# Patient Record
(demographics unavailable — no encounter records)

---

## 2017-01-03 NOTE — EMERGENCY ROOM REPORT
History of Present Illness


General


Chief Complaint:  Female Urogenital Problems


Source:  Patient





Present Illness


HPI


Patient presents with a rash in the vaginal area.  Been going on for a week.  

She thinks is due to allergy to condoms.  She is sexually active.  No fever or 

chills but no nausea no vomiting.  Discharge.  No urinary frequency.  Burns 

with urination.


Allergies:  


Coded Allergies:  


     PENICILLINS (Verified  Allergy, Severe, Anaphylaxis, 1/3/17)


     PINEAPPLE (Verified  Allergy, Severe, 1/3/17)


     SULFAMETHOXAZOLE (Verified  Allergy, Severe, Anaphylaxis, 1/3/17)


     TRIMETHOPRIM (Verified  Allergy, Severe, Anaphylaxis, 1/3/17)


     LATEX, NATURAL RUBBER (Verified  Allergy, Unknown, 1/3/17)





Patient History


Past Medical History:  see triage record, old chart reviewed, DM


Past Surgical History:  none


Pertinent Family History:  none


Social History:  Denies: smoking


Last Menstrual Period:  August 2016


Pregnant Now:  No


Immunizations:  other


Reviewed Nursing Documentation:  PMH: Agreed, PSxH: Agreed





Nursing Documentation-PMH


Past Medical History:  No History, Except For


Hx Hypertension:  Yes


Hx Asthma:  Yes


Hx Diabetes:  Yes - borderline DM2





Review of Systems


Eye:  Denies: blurred vision, eye pain


ENT:  Denies: ear pain, nose congestion, throat swelling


Respiratory:  Denies: cough, shortness of breath


Cardiovascular:  Denies: chest pain, palpitations


Gastrointestinal:  Denies: abdominal pain, diarrhea, nausea, vomiting


Musculoskeletal:  Denies: back pain, joint pain


Skin:  Denies: rash


Neurological:  Denies: headache, numbness


Endocrine:  Denies: increased thirst, increased urine


Hematologic/Lymphatic:  Denies: easy bruising


All Other Systems:  negative except mentioned in HPI





Physical Exam





Vital Signs








  Date Time  Temp Pulse Resp B/P Pulse Ox O2 Delivery O2 Flow Rate FiO2


 


1/3/17 01:36 98.6 99 16 122/85 97 Room Air  





vitals normal.


Sp02 EP Interpretation:  reviewed, normal


General Appearance:  well appearing, no apparent distress, alert


Head:  normocephalic, atraumatic


Eyes:  bilateral eye EOMI, bilateral eye PERRL


ENT:  hearing grossly normal, normal pharynx


Neck:  full range of motion, supple, no meningismus


Respiratory:  chest non-tender, lungs clear, normal breath sounds


Cardiovascular #1:  regular rate, rhythm, no murmur


Gastrointestinal:  normal bowel sounds, non tender, no mass, no organomegaly, 

no bruit, non-distended


Genitourinary:  other - vaginal area is irritated and has macerated skin. 

Diffuse erythema.


Musculoskeletal:  back normal, gait/station normal, normal range of motion


Neurologic:  alert, oriented x3


Psychiatric:  mood/affect normal


Skin:  warm/dry





Medical Decision Making


Diagnostic Impression:  


 Primary Impression:  


 UTI (lower urinary tract infection)


 Additional Impressions:  


 Vaginitis


 Qualified Codes:  N76.0 - Acute vaginitis


 Hyperglycemia due to type 2 diabetes mellitus


 Qualified Codes:  E11.65 - Type 2 diabetes mellitus with hyperglycemia


 Morbid obesity with BMI of 40.0-44.9, adult


ER Course


She presents with a vaginitis most likely secondary to her diabetes.  She says 

in her tract infection.  Will treat both.  No evidence of ectopic.  We'll 

discharge home.





Last Vital Signs








  Date Time  Temp Pulse Resp B/P Pulse Ox O2 Delivery O2 Flow Rate FiO2


 


1/3/17 01:40 98.6  16 125/86 100 Room Air  


 


1/3/17 01:36  99      








Status:  improved


Disposition:  HOME, SELF-CARE


Condition:  Stable


Scripts


Nitrofurantoin Monohyd/M-Cryst (Nitrofurantoin Mono-Mcr 100 mg) 100 Mg Capsule


100 MG ORAL Q12H, #14 CAP


   Prov: TREMAINE HERRING M.D.         1/3/17 


Nystatin/Triamcin (NYSTATIN-TRIAMCINOLONE CREAM) 15 Gm Cream..g.


15 GM TP BID, #60 GM


   Prov: TREMAINE HERRING M.D.         1/3/17


Patient Instructions:  Vaginal Yeast Infection, Adult





Additional Instructions:  


Followup with your DrColeen in 7 days.  Return if symptom worsen.











TREMAINE HERRING M.D. Joel 3, 2017 02:57

## 2017-02-02 NOTE — EMERGENCY ROOM REPORT
History of Present Illness


General


Chief Complaint:  Female Urogenital Problems


Source:  Patient





Present Illness


HPI


31-year-old female presents to emergency Department complaining of itchy/

burning vaginal irritation x4 days.  She states she has a history of recurring 

rashes in the vaginal area.  Patient reports swelling to the external right 

labia the rash started in progress over to the left.  Patient reports pain 10 

on a 10 in nature described as burning and sensitive to light touch.  She 

denies swelling in the lymph nodes.  Patient reports being sexually active, 

does not take birth control, and has irregular periods.  Her last period was in 

September.  She denies nausea, vomiting, fevers, chills.  Patient reports mild 

burning with urination. Denies CP, Palpitations, LOC, AMS, dizziness, Changes 

in Vision, Sensation, paresthesias, or a sudden severe headache.


Allergies:  


Coded Allergies:  


     PENICILLINS (Verified  Allergy, Severe, Anaphylaxis, 1/3/17)


     PINEAPPLE (Verified  Allergy, Severe, 1/3/17)


     SULFAMETHOXAZOLE (Verified  Allergy, Severe, Anaphylaxis, 1/3/17)


     TRIMETHOPRIM (Verified  Allergy, Severe, Anaphylaxis, 1/3/17)


     LATEX, NATURAL RUBBER (Verified  Allergy, Unknown, 1/3/17)





Patient History


Past Medical History:  see triage record


Past Surgical History:  none


Pertinent Family History:  none


Last Menstrual Period:  09/2015


Immunizations:  UTD


Reviewed Nursing Documentation:  PMH: Agreed, PSxH: Agreed





Nursing Documentation-PMH


Past Medical History:  No History, Except For


Hx Hypertension:  Yes


Hx Asthma:  Yes


Hx Diabetes:  Yes - Borderline





Review of Systems


All Other Systems:  negative except mentioned in HPI





Physical Exam





Vital Signs








  Date Time  Temp Pulse Resp B/P Pulse Ox O2 Delivery O2 Flow Rate FiO2


 


2/2/17 12:54 98.8 96 16 128/87 96 Room Air  








Sp02 EP Interpretation:  reviewed, normal


General Appearance:  no apparent distress, alert, GCS 15, non-toxic


Head:  normocephalic, atraumatic


Eyes:  bilateral eye PERRL, bilateral eye normal inspection


ENT:  hearing grossly normal, normal pharynx, no angioedema, normal voice


Neck:  full range of motion, supple/symm/no masses


Respiratory:  chest non-tender, lungs clear, normal breath sounds, speaking 

full sentences


Cardiovascular #1:  regular rate, rhythm


Gastrointestinal:  normal bowel sounds, non tender, soft, no mass, no guarding


Rectal:  deferred


Genitourinary:  normal inspection, no CVA tenderness, other - swelling, erythema

, and increased sensitivity with rash of the external labia. no vessicles, no 

crusting, no d/c noted macerated appearance.


Musculoskeletal:  back normal, gait/station normal


Neurologic:  alert, oriented x3, responsive, motor strength/tone normal, 

sensory intact, speech normal


Psychiatric:  judgement/insight normal, memory normal, mood/affect normal, no 

suicidal/homicidal ideation


Skin:  normal color, warm/dry, well hydrated, rash - swelling, erythema, and 

increased sensitivity with rash of the external labia. no vessicles, no crusting

, no d/c noted macerated appearance. , other


Lymphatic:  no adenopathy





Medical Decision Making


PA Attestation


Dr. Prieto is my supervising Physician whom patient management has been 

discussed with.


Diagnostic Impression:  


 Primary Impression:  


 Vaginitis


 Qualified Codes:  N76.0 - Acute vaginitis


 Additional Impressions:  


 Rash and other nonspecific skin eruption


 UTI (lower urinary tract infection)


ER Course


31-year-old female presents to emergency Department complaining of itchy/

burning vaginal irritation x4 days. 


Ddx considered but are not limited to UTi , Pyelo, STI, Stone, Cystitis, 

vaginal laceration, vaginitis, abscess, cellulitis, LGV


Vital signs: are WNL, pt. is afebrile 


H& PE are most consistent with: with Vaginitis, will also check for UTI due to 

pt. having dysuria.


ORDERS:


- UA labs are attached : WBC's, Leukocyte esterases and few bacteria  

consistent with UTI. 





ED INTERVENTIONS: None required at this time. 


DISCHARGE: At this time pt. is stable for d/c to home. Will provide printed 

patient care instructions, and any necessary prescriptions. Care plan and 

follow up instructions have been discussed with the patient prior to discharge.





Labs








Test


  2/2/17


13:20


 


Urine Color Pale yellow 


 


Urine Appearance


  Slightly


cloudy


 


Urine pH 7 (4.5-8.0) 


 


Urine Specific Gravity


  1.005


(1.005-1.035)


 


Urine Protein


  Negative


(NEGATIVE)


 


Urine Glucose (UA) 4+ (NEGATIVE) 


 


Urine Ketones 2+ (NEGATIVE) 


 


Urine Occult Blood 3+ (NEGATIVE) 


 


Urine Nitrite


  Negative


(NEGATIVE)


 


Urine Bilirubin


  Negative


(NEGATIVE)


 


Urine Urobilinogen


  Normal MG/DL


(0.0-1.0)


 


Urine Leukocyte Esterase 3+ (NEGATIVE) 


 


Urine RBC


  2-4 /HPF (0 -


2)


 


Urine WBC


  5-10 /HPF (0 -


2)


 


Urine Squamous Epithelial


Cells Few /LPF


(NONE/OCC)


 


Urine Bacteria


  Few /HPF


(NONE)


 


Urine HCG, Qualitative Negative 











Last Vital Signs








  Date Time  Temp Pulse Resp B/P Pulse Ox O2 Delivery O2 Flow Rate FiO2


 


2/2/17 13:06 98.8 81 16 128/87 96 Room Air  








Disposition:  HOME, SELF-CARE


Condition:  Stable


Scripts


Nystatin* (NYSTATIN*) 15 Gm Cream..g.


1 APPLIC TOPIC THREE TIMES A DAY, #15 GM


   Prov: Evette Lee         2/2/17 


Fluconazole (FLUCONAZOLE) 100 Mg Tablet


100 MG ORAL DAILY for 3 Days, #3 TAB 0 Refills


   Prov: Evette Lee         2/2/17 


Nitrofurantoin Monohyd/M-Cryst* (MACROBID 100 MG*) 100 Mg Capsule


100 MG ORAL EVERY 12 HOURS for 5 Days, #10 CAP


   Prov: Evette Lee         2/2/17


Referrals:  


NOT CHOSEN [facility]/MD,REFERRING (PCP)


Patient Instructions:  Vaginitis, Urinary Tract Infection





Additional Instructions:  


Take medications as directed. 


Follow up with PCP in 3-5 days 


Return sooner to ED if new symptoms occur, or current symptoms become worse.











Evette Lee Feb 2, 2017 14:30

## 2017-03-06 NOTE — DIAGNOSTIC IMAGING REPORT
Indication: PAIN



Technique: 3 views right foot



Comparison: none



Findings: There is metatarsus adductus and mild hallux valgus. Otherwise normal 

bony

alignment. No acute fractures. No dislocations. The joint spaces are preserved.



Impression: Negative

## 2017-03-07 NOTE — EMERGENCY ROOM REPORT
History of Present Illness


General


Chief Complaint:  Lower Extremity Injury





Present Illness


HPI


The pt is a 32 yo F presenting for R toe pain which began one week prior after 

dropping an iPad onto it from 3 feet up. Pt states pain is a 7/10 dull ache and 

does not radiate. The pt denies prior injury to this area. Pt denies numbness/

tingling. Pain worse with touch. Pt denies any other symptoms. 


 (JUANA ROBLES P.A.)


Allergies:  


Coded Allergies:  


     PENICILLINS (Verified  Allergy, Severe, Anaphylaxis, 1/3/17)


     PINEAPPLE (Verified  Allergy, Severe, 1/3/17)


     SULFAMETHOXAZOLE (Verified  Allergy, Severe, Anaphylaxis, 1/3/17)


     TRIMETHOPRIM (Verified  Allergy, Severe, Anaphylaxis, 1/3/17)


     LATEX, NATURAL RUBBER (Verified  Allergy, Unknown, 1/3/17)





Patient History


Past Medical History:  see triage record


Pertinent Family History:  none


Reviewed Nursing Documentation:  PMH: Agreed, PSxH: Agreed (JUANA ROBLES P.A.)





Nursing Documentation-PMH


Hx Hypertension:  Yes


Hx Asthma:  Yes


Hx Diabetes:  Yes - Borderline


 (JUANA ROBLES P.A.)





Review of Systems


All Other Systems:  negative except mentioned in HPI


 (JUANA ROBLES P.A.)





Physical Exam





Vital Signs








  Date Time  Temp Pulse Resp B/P Pulse Ox O2 Delivery O2 Flow Rate FiO2


 


3/6/17 13:46 98.6 96 20 110/78 97 Room Air  








Sp02 EP Interpretation:  reviewed, normal


General Appearance:  no apparent distress, alert, GCS 15, non-toxic


Head:  normocephalic, atraumatic


Eyes:  bilateral eye PERRL, bilateral eye normal inspection


ENT:  hearing grossly normal, normal pharynx, no angioedema, normal voice


Musculoskeletal:  gait/station normal, normal range of motion, tender - TTP 

over distal R 1st toe


Neurologic:  alert, oriented x3, responsive, motor strength/tone normal, 

sensory intact, speech normal


Psychiatric:  judgement/insight normal, memory normal, mood/affect normal, no 

suicidal/homicidal ideation


Reflexes:  3+ bicep (R), 3+ bicep (L), 3+ tricep (R), 3+ tricep (L), 3+ knee (R)

, 3+ knee (L)


Skin:  normal color, no rash, warm/dry, well hydrated


Lymphatic:  no adenopathy


 (JUANA ROBLES)





Medical Decision Making


PA Attestation


Dr. Bowie is my supervising physician. Patient management was discussed 

with my supervising physician


 (JUANA ROBLES)


Diagnostic Impression:  


 Primary Impression:  


 Contusion, toe


ER Course


The pt is a 32 yo F presenting for R toe pain which began one week prior





Ddx considered include but not limited to sprain/strain, fracture, contusion





PE:


Vitals WNL. 


NAD.


R 1st toe: Skin intact. No erythema or edema. Sensation intact to light touch. 

Full AROM.  No joint laxity. No obvious deformity. + TTP over distal 1st toe. 

No subungual hematoma. 





Pt is given motrin for pain





Xray is unremarkable. 





Pt will be MO'ed home and will FU with PMD. ER precautions given 


 (JUANA ROBLES)


ER Course


Scribe documentation reviewed by me and is accurate.


 (Stefan Prieto M.D.)





Other X-Ray Diagnostic Results


Other X-Ray Diagnostic Results :  


   X-Ray Ordered:  R foot xray


   Date:  Mar 7, 2017


   EP Interpretation:  Yes


   Findings:  no fractures, no dislocation, no soft tissue swelling


   Number of Views:  3


   PA Scribe Text


I am acting as scribe for my supervising physician. My supervising physician's 

interpretation of the R foot xrays are there are no fractures, dislocations or 

soft tissue swelling.


 (JUANA ROBLES)





Last Vital Signs








  Date Time  Temp Pulse Resp B/P Pulse Ox O2 Delivery O2 Flow Rate FiO2


 


3/6/17 14:46 98.6 98 17 141/91 98 Room Air  








Status:  improved


 (JUANA ROBLES P.A.)


Disposition:  HOME, SELF-CARE


Condition:  Improved


Scripts


Ibuprofen* (MOTRIN*) 600 Mg Tablet


600 MG ORAL Q8H Y for For Pain, #30 TAB 0 Refills


   Prov: JUANA ROBLES         3/6/17


Referrals:  


NON PHYSICIAN (PCP)


Patient Instructions:  Foot Contusion





Additional Instructions:  


I discussed my findings with the patient. All questions and concerns have been 

answered. Treatment and medication compliance have been addressed. I advised 

the patient that they need to follow up with PMD in 3-5 days. Return to ED if 

pain remains or worsens, numbness or tingling occurs, new rash is noticed, 

fever is noticed, or if needed for any reason. Patient verbalized understanding 

of discharge instructions.











JUANA ROBLES Mar 7, 2017 12:35


Stefan Prieto M.D. Mar 8, 2017 02:24

## 2017-06-19 NOTE — EMERGENCY ROOM REPORT
History of Present Illness


General


Chief Complaint:  Headache


Source:  Patient





Present Illness


[facility]


The patient is a 32-year-old female presenting for headache and vaginal pain.  

The patient states that she noticed dysuria one week prior as well as a vaginal 

rash.  Rash is both described as itchy and painful.  Pain described as a 5/10 

burning sensation and does not radiate.  Worse with touch.  She denies any 

vaginal discharge, hematuria, flank pain, fever, chills.


She also admits to headache which began last night and is described as an 8/10 

dull ache to the left side of the head.  Pain radiates to the left thigh.  She 

admits to photophobia.  She states that she has had migraine headaches in the 

past and this feels similar.  She has not tried any medications yet.


She denies other symptoms including nausea, vomiting


Allergies:  


Coded Allergies:  


     PENICILLINS (Verified  Allergy, Severe, Anaphylaxis, 1/3/17)


     PINEAPPLE (Verified  Allergy, Severe, 1/3/17)


     SULFAMETHOXAZOLE (Verified  Allergy, Severe, Anaphylaxis, 1/3/17)


     TRIMETHOPRIM (Verified  Allergy, Severe, Anaphylaxis, 1/3/17)


     LATEX, NATURAL RUBBER (Verified  Allergy, Unknown, 1/3/17)





Patient History


Past Medical History:  see triage record


Pertinent Family History:  none


Last Menstrual Period:  last year


Pregnant Now:  No


Reviewed Nursing Documentation:  PMH: Agreed, PSxH: Agreed





Nursing Documentation-PMH


Past Medical History:  No History, Except For


Hx Hypertension:  Yes


Hx Asthma:  Yes


Hx Diabetes:  Yes - Borderline





Review of Systems


All Other Systems:  negative except mentioned in HPI





Physical Exam





Vital Signs








  Date Time  Temp Pulse Resp B/P Pulse Ox O2 Delivery O2 Flow Rate FiO2


 


6/19/17 14:22 98.4 97 18 113/83 98 Room Air  








Sp02 EP Interpretation:  reviewed, normal


General Appearance:  no apparent distress, alert, GCS 15, non-toxic


Head:  normocephalic, atraumatic


Eyes:  bilateral eye PERRL, bilateral eye normal inspection


ENT:  hearing grossly normal, normal pharynx, no angioedema, normal voice


Neck:  full range of motion, supple/symm/no masses


Respiratory:  chest non-tender, lungs clear, normal breath sounds, speaking 

full sentences


Gastrointestinal:  normal bowel sounds, non tender, soft, non-distended, no 

guarding, no rebound


Genitourinary:  no CVA tenderness, other - external vaginal erythema with 

Satellite lesions. Exam done with Jordyn in room


Musculoskeletal:  back normal, gait/station normal, normal range of motion, non-

tender


Neurologic:  alert, oriented x3, responsive, motor strength/tone normal, 

sensory intact, speech normal


Psychiatric:  judgement/insight normal, memory normal, mood/affect normal, no 

suicidal/homicidal ideation


Skin:  rash - large erythema of external vagina with satelite lesions


Lymphatic:  no adenopathy





Medical Decision Making


PA Attestation


Dr. Shaffer is my supervising physician. Patient management was discussed with 

my supervising physician


Diagnostic Impression:  


 Primary Impression:  


 Candidiasis of female genitalia


 Additional Impressions:  


 Urinary tract infection


 Qualified Codes:  N39.0 - Urinary tract infection, site not specified


 Migraine


 Qualified Codes:  G43.909 - Migraine, unspecified, not intractable, without 

status migrainosus


ER Course


The patient is a 32-year-old female presenting for headache and vaginal pain





Differential diagnoses include but not limited to Migraine, tension headache, 

meningitis, UTI, BV, candidiasis, among others





PE: afebrile. NAD


HEENT unremarkable. PERRL. EOMI. 


Neck is soft and supple. 


Abd is soft. Non tender. No CVA tenderness





There is external vaginal diffuse erythema with satellite lesions.  No edema.  

No discharge





Urinalysis shows leukocyte esterase and white blood cells.  Negative nitrites





The patient is treated for headache with IM Toradol, Bentyl, and regular.  She 

feels better.





The patient will be treated for UTI as well as external vaginal candidiasis.





She will follow up with primary doctor.  ER precautions given





Laboratory Tests








Test


  6/19/17


15:00


 


Urine Color Pale yellow  


 


Urine Appearance


  Slightly


cloudy


 


Urine pH 6.5 (4.5-8.0)  


 


Urine Specific Gravity


  1.010


(1.005-1.035)


 


Urine Protein


  Negative


(NEGATIVE)


 


Urine Glucose (UA)


  4+ (NEGATIVE)


H


 


Urine Ketones


  3+ (NEGATIVE)


H


 


Urine Occult Blood


  Negative


(NEGATIVE)


 


Urine Nitrite


  Negative


(NEGATIVE)


 


Urine Bilirubin


  Negative


(NEGATIVE)


 


Urine Urobilinogen


  Normal MG/DL


(0.0-1.0)


 


Urine Leukocyte Esterase


  3+ (NEGATIVE)


H


 


Urine RBC


  2-4 /HPF (0 -


2)  H


 


Urine WBC


  5-10 /HPF (0 -


2)  H


 


Urine Squamous Epithelial


Cells Moderate /LPF


(NONE/OCC)  H


 


Urine Bacteria


  Few /HPF


(NONE)


 


Urine HCG, Qualitative Negative  








Lab Results Impression


Neg nitrites. There are elevated WBCs with LE and urine bacteria.





Last Vital Signs








  Date Time  Temp Pulse Resp B/P Pulse Ox O2 Delivery O2 Flow Rate FiO2


 


6/19/17 16:06 98.3 92 16 122/75 97 Room Air  








Status:  improved


Disposition:  HOME, SELF-CARE


Condition:  Improved


Scripts


Clotrimazole (Clotrimazole) 30 Gm Cream..g.


1 APPLIC TP Q12HR, #30 GM


   Prov: JUANA ROBLES.A.         6/19/17 


Cephalexin* (KEFLEX*) 500 Mg Capsule


500 MG ORAL EVERY 6 HOURS, #28 CAP


   Prov: UJANA ROBLES P.A.         6/19/17 


Ibuprofen* (MOTRIN*) 600 Mg Tablet


600 MG ORAL Q8H Y for For Pain, #30 TAB 0 Refills


   Prov: JUANA ROBLES P.A.         6/19/17


Patient Instructions:  Urinary Tract Infection, Skin Yeast Infection, Migraine 

Headache





Additional Instructions:  


I discussed my findings with the patient. All questions and concerns have been 

answered. Treatment and medication compliance have been addressed. I advised 

the patient that they need to follow up with PMD in 3-5 days. Return to ED if 

symptoms worsen, new symptoms arise, or if needed for any reason. Patient 

verbalized understanding of discharge instructions.











JUANA ROBLES Jun 19, 2017 17:04

## 2017-08-24 NOTE — EMERGENCY ROOM REPORT
History of Present Illness


General


Chief Complaint:  Skin Rash/Abscess


Source:  Patient





Present Illness


HPI


32-year-old female presents to the emergency department complaining of pain, 

swelling and tenderness that she rates as 8/10 in severity in the right axilla 

x1.5 weeks.  Patient has a history of previous hidradenitis.  Patient denies 

fevers, chills, nausea, vomiting.  Patient reports intermittent discharge from 

the axilla.  Patient states she has not, normal PCP and has not followed up for 

her repeated hidradenitis. denies pregnancy.  Denies CP, Palpitations, LOC, AMS

, dizziness, Changes in Vision, Sensation, paresthesias, or a sudden severe 

headache.


Allergies:  


Coded Allergies:  


     PENICILLINS (Verified  Allergy, Severe, Anaphylaxis, 1/3/17)


     PINEAPPLE (Verified  Allergy, Severe, 1/3/17)


     SULFAMETHOXAZOLE (Verified  Allergy, Severe, Anaphylaxis, 1/3/17)


     TRIMETHOPRIM (Verified  Allergy, Severe, Anaphylaxis, 1/3/17)


     LATEX, NATURAL RUBBER (Verified  Allergy, Unknown, 1/3/17)





Patient History


Past Medical History:  see triage record


Past Surgical History:  none


Pertinent Family History:  none


Last Menstrual Period:  2016


Pregnant Now:  No - cycles are not regulated


:  0


Para:  0


Reviewed Nursing Documentation:  PMH: Agreed, PSxH: Agreed





Nursing Documentation-PMH


Hx Hypertension:  Yes


Hx Asthma:  Yes


Hx Diabetes:  Yes - Borderline





Review of Systems


All Other Systems:  negative except mentioned in HPI





Physical Exam





Vital Signs








  Date Time  Temp Pulse Resp B/P (MAP) Pulse Ox O2 Delivery O2 Flow Rate FiO2


 


17 13:17 98.2 107 24 109/63 97 Room Air  








Sp02 EP Interpretation:  reviewed, normal


General Appearance:  no apparent distress, alert, GCS 15, non-toxic


Head:  normocephalic, atraumatic


Eyes:  bilateral eye normal inspection, bilateral eye PERRL


ENT:  hearing grossly normal, normal pharynx, no angioedema, normal voice


Neck:  full range of motion, supple/symm/no masses


Respiratory:  lungs clear, normal breath sounds, speaking full sentences


Cardiovascular #1:  regular rate, rhythm, tachycardia


Musculoskeletal:  back normal, gait/station normal, normal range of motion, non-

tender


Neurologic:  alert, oriented x3, responsive, motor strength/tone normal, 

sensory intact, speech normal


Psychiatric:  judgement/insight normal, memory normal, mood/affect normal


Skin:  normal color, no rash, warm/dry, well hydrated, other - multiple areas 

of swelling/induration that is confluent with obvious tracking in the right 

axilla, skin has macerated appearance, is tender, and scant d/c noted.





Medical Decision Making


PA Attestation


Dr. Bowie  is my supervising Physician whom patient management has been 

discussed with.


Diagnostic Impression:  


 Primary Impression:  


 Hidradenitis suppurativa of right axilla


ER Course


32-year-old female presents to the emergency department complaining of pain, 

swelling and tenderness that she rates as 8/10 in severity in the right axilla 

x1.5 weeks.  Patient has a history of previous hidradenitis.  Patient denies 

fevers, chills, nausea, vomiting.  Patient reports intermittent discharge from 

the axilla.  Patient states she has not, normal PCP and has not followed up for 

her repeated hidradenitis. Denies CP, Palpitations, LOC, AMS, dizziness, 

Changes in Vision, Sensation, paresthesias, or a sudden severe headache.





Ddx considered but are not limited to cellulitis, abscess, hidradenitis, sepsis 

just to name a few





Vital signs: are WNL, pt. is afebrile


H&PE are most consistent with exacerbation of hidradenitis suppurativa of the 

right axilla.  left axilla is not infected at this time. no evidence to suggest 

spread of the infection. 


ORDERS: none required at this time, the diagnosis is clinical





ED INTERVENTIONS: 


-Wound dressing change by RN


- Bacitracin is applied. 





-d/w pt. that it is imperative to finish all abx that she will be prescribed, d/

w pt. that follow up is very important as if she continues to have frequent 

exacerbations that she may require surgical intervention. d/w pt. to return to 

the ED with worsening or new symptoms, such as fevers.  





DISCHARGE: At this time pt. is stable for d/c to home. Will provide printed 

patient care instructions, and any necessary prescriptions. Care plan and 

follow up instructions have been discussed with the patient prior to discharge.





Last Vital Signs








  Date Time  Temp Pulse Resp B/P (MAP) Pulse Ox O2 Delivery O2 Flow Rate FiO2


 


17 13:17 98.2 107 24 109/63 97 Room Air  








Disposition:  HOME, SELF-CARE


Condition:  Stable


Scripts


Lidocaine HCL 2% Jelly* (Lidocaine Jelly 2%*) 5 Ml Jel.pf.redd


1 ML TOPIC DAILY, #20 ML


   Prov: Evette Lee         17 


Acetaminophen* (TYLENOL EXTRA STRENGTH*) 500 Mg Tablet


500 MG ORAL Q6H, #20 TAB 0 Refills


   Prov: Evette Lee         17 


Doxycycline Hyclate* (VIBRAMYCIN*) 100 Mg Capsule


100 MG ORAL EVERY 12 HOURS for 7 Days, #14 CAP 0 Refills


   Prov: Evette Lee         17


Patient Instructions:  Hidradenitis Suppurativa





Additional Instructions:  


Take medications as directed. 


 ** Follow up with a Primary Care Provider in 3-5 days, even if your symptoms 

have resolved. ** 


--Please review list of primary care clinics, if you do not already have a 

primary care provider





Return sooner to ED if new symptoms occur, or current symptoms become worse. 


Do not drink alcohol, drive, or operate heavy machinery while taking Norco as 

this may cause drowsiness. 











- Please note that this Emergency Department Report was dictated using [employer] technology software, occasionally this can lead to 

erroneous entry secondary to interpretation by the dictation equipment.











Evette Lee Aug 24, 2017 14:07

## 2017-11-08 NOTE — EMERGENCY ROOM REPORT
History of Present Illness


General


Chief Complaint:  Skin Rash/Abscess


Source:  Patient, Medical Record





Present Illness


HPI


32-year-old female presents to the emergency department complaining of itchy 

rash in the vaginal area x2 days denies recent antibiotic use, lesions, 

bleeding or swollen tender lymph nodes.  Patient denies recent unprotected 

intercourse.  Patient does report scant thickened white vaginal DC.  She denies 

abdominal pain/tenderness.  Denies dysuria, hematuria she reports frequency.  

Patient also states that she is diabetic.  Patient also reports pain in the 

right toe that she rates as 7/10 in severity times several months.  Patient 

states that she fractured her toe several months ago and she continues to have 

dull pain.  Patient states she never followed up with orthopedic specialist.  

Patient denies new trauma or fall.  Denies erythema, open wounds or swelling.  

She states her pain is exacerbated upon walking.  Denies numbness tingling or 

loss of sensation or gross motor movements of the extremities, incontinence of 

bowel or bladder. Denies CP, Palpitations, LOC, AMS, dizziness, Changes in 

Vision, Sensation, paresthesias, or a sudden severe headache.


Allergies:  


Coded Allergies:  


     PENICILLINS (Verified  Allergy, Severe, Anaphylaxis, 1/3/17)


     PINEAPPLE (Verified  Allergy, Severe, 1/3/17)


     SULFAMETHOXAZOLE (Verified  Allergy, Severe, Anaphylaxis, 1/3/17)


     TRIMETHOPRIM (Verified  Allergy, Severe, Anaphylaxis, 1/3/17)


     LATEX, NATURAL RUBBER (Verified  Allergy, Unknown, 1/3/17)





Patient History


Past Medical History:  see triage record, DM


Past Surgical History:  none


Pertinent Family History:  none


Last Menstrual Period:  07/17


Immunizations:  UTD


Reviewed Nursing Documentation:  PMH: Agreed, PSxH: Agreed





Nursing Documentation-PMH


Past Medical History:  No History, Except For


Hx Hypertension:  Yes


Hx Asthma:  Yes


Hx Diabetes:  Yes - Borderline





Review of Systems


All Other Systems:  negative except mentioned in HPI





Physical Exam





Vital Signs








  Date Time  Temp Pulse Resp B/P (MAP) Pulse Ox O2 Delivery O2 Flow Rate FiO2


 


11/8/17 12:56 98.1 100 18 134/91 95 Room Air  








Sp02 EP Interpretation:  reviewed, normal


General Appearance:  no apparent distress, alert, GCS 15, non-toxic


Head:  normocephalic, atraumatic


Eyes:  bilateral eye normal inspection, bilateral eye PERRL


ENT:  hearing grossly normal, normal voice


Neck:  full range of motion


Respiratory:  chest non-tender, lungs clear, normal breath sounds, speaking 

full sentences


Cardiovascular #1:  regular rate, rhythm


Gastrointestinal:  normal bowel sounds, non tender, soft, no guarding, no 

rebound


Rectal:  deferred


Genitourinary:  normal inspection, no CVA tenderness


Musculoskeletal:  back normal, gait/station normal, normal range of motion, non-

tender


Neurologic:  alert, oriented x3, responsive, motor strength/tone normal, 

sensory intact, normal gait, speech normal


Psychiatric:  other - Pt has very flattened affect, possible MR.


Skin:  normal color, warm/dry, well hydrated, rash - mild erythema, with 

bilateral labial swelling in the groin.


Lymphatic:  no adenopathy





Medical Decision Making


PA Attestation


Dr. khoury is my supervising Physician whom patient management has been 

discussed with.


Diagnostic Impression:  


 Primary Impression:  


 Vaginitis


 Qualified Codes:  N76.0 - Acute vaginitis


ER Course


32-year-old female presents to the emergency department complaining of itchy 

rash in the vaginal area x2 days denies recent antibiotic use, lesions, 

bleeding or swollen tender lymph nodes.  Patient denies recent unprotected 

intercourse.  Patient does report scant thickened white vaginal DC.  She denies 

abdominal pain/tenderness.  Denies dysuria, hematuria she reports frequency.  

Patient also states that she is diabetic.  Patient also reports pain in the 

right toe that she rates as 7/10 in severity times several months.  Patient 

states that she fractured her toe several months ago and she continues to have 

dull pain.  Patient states she never followed up with orthopedic specialist.  

Patient denies new trauma or fall.  Denies erythema, open wounds or swelling.  

She states her pain is exacerbated upon walking.  Denies numbness tingling or 

loss of sensation or gross motor movements of the extremities, incontinence of 

bowel or bladder. Denies CP, Palpitations, LOC, AMS, dizziness, Changes in 

Vision, Sensation, paresthesias, or a sudden severe headache. 





Ddx considered but are not limited to UTi , Pyelo, STI, Stone, Cystitis, 

vaginal laceration, vaginitis.


Vital signs: are WNL, pt. is afebrile 





H& PE are most consistent with: yeast Vaginitis.


ORDERS:





- UA labs are attached  





ED INTERVENTIONS: None required at this time. 





-Discussed with patient that she will need to followup with orthopedic 

specialist to evaluate her previously fractured toe.  In the meantime will 

treat conservatively. 





DISCHARGE: At this time pt. is stable for d/c to home. Will provide printed 

patient care instructions, and any necessary prescriptions. Care plan and 

follow up instructions have been discussed with the patient prior to discharge.





Labs








Test


  11/8/17


13:06


 


Urine Color Pale yellow 


 


Urine Appearance Clear 


 


Urine pH 6 (4.5-8.0) 


 


Urine Specific Gravity


  1.015


(1.005-1.035)


 


Urine Protein


  Negative


(NEGATIVE)


 


Urine Glucose (UA) 4+ (NEGATIVE) 


 


Urine Ketones 2+ (NEGATIVE) 


 


Urine Occult Blood


  Negative


(NEGATIVE)


 


Urine Nitrite


  Negative


(NEGATIVE)


 


Urine Bilirubin


  Negative


(NEGATIVE)


 


Urine Urobilinogen


  Normal MG/DL


(0.0-1.0)


 


Urine Leukocyte Esterase 1+ (NEGATIVE) 


 


Urine RBC


  2-4 /HPF (0 -


2)


 


Urine WBC


  2-4 /HPF (0 -


2)


 


Urine Squamous Epithelial


Cells Few /LPF


(NONE/OCC)


 


Urine Bacteria


  Few /HPF


(NONE)











Last Vital Signs








  Date Time  Temp Pulse Resp B/P (MAP) Pulse Ox O2 Delivery O2 Flow Rate FiO2


 


11/8/17 12:56 98.1 100 18 134/91 95 Room Air  








Disposition:  HOME, SELF-CARE


Condition:  Stable


Scripts


Fluconazole (FLUCONAZOLE) 100 Mg Tablet


100 MG ORAL DAILY for 5 Days, #5 TAB 0 Refills


   Prov: Evette Lee         11/8/17 


Nystatin* (NYSTATIN*) 15 Gm Cream..g.


1 APPLIC TOPIC THREE TIMES A DAY, #15 GM


   Prov: Evette Lee         11/8/17


Referrals:  


NON PHYSICIAN (PCP)


Patient Instructions:  Vaginitis





Additional Instructions:  


Take medications as directed. 


 ** Follow up with a Primary Care Provider in 3-5 days, even if your symptoms 

have resolved. ** 


--Please review list of primary care clinics, if you do not already have a 

primary care provider





Return sooner to ED if new symptoms occur, or current symptoms become worse. 








- Please note that this Emergency Department Report was dictated using [employer] technology software, occasionally this can lead to 

erroneous entry secondary to interpretation by the dictation equipment.











Evette Lee Nov 8, 2017 13:52

## 2017-11-08 NOTE — EMERGENCY ROOM REPORT
History of Present Illness


General


Chief Complaint:  Skin Rash/Abscess


Source:  Patient, Medical Record





Present Illness


HPI


32-year-old female presents to the emergency department complaining of itchy 

rash in the vaginal area x2 days denies recent antibiotic use, lesions, 

bleeding or swollen tender lymph nodes.  Patient denies recent unprotected 

intercourse.  Patient does report scant thickened white vaginal DC.  She denies 

abdominal pain/tenderness.  Denies dysuria, hematuria she reports frequency.  

Patient also states that she is diabetic.  Patient also reports pain in the 

right toe that she rates as 7/10 in severity times several months.  Patient 

states that she fractured her toe several months ago and she continues to have 

dull pain.  Patient states she never followed up with orthopedic specialist.  

Patient denies new trauma or fall.  Denies erythema, open wounds or swelling.  

She states her pain is exacerbated upon walking.  Denies numbness tingling or 

loss of sensation or gross motor movements of the extremities, incontinence of 

bowel or bladder. Denies CP, Palpitations, LOC, AMS, dizziness, Changes in 

Vision, Sensation, paresthesias, or a sudden severe headache.


Allergies:  


Coded Allergies:  


     PENICILLINS (Verified  Allergy, Severe, Anaphylaxis, 1/3/17)


     PINEAPPLE (Verified  Allergy, Severe, 1/3/17)


     SULFAMETHOXAZOLE (Verified  Allergy, Severe, Anaphylaxis, 1/3/17)


     TRIMETHOPRIM (Verified  Allergy, Severe, Anaphylaxis, 1/3/17)


     LATEX, NATURAL RUBBER (Verified  Allergy, Unknown, 1/3/17)





Patient History


Past Medical History:  see triage record, DM


Past Surgical History:  none


Pertinent Family History:  none


Last Menstrual Period:  07/17


Immunizations:  UTD


Reviewed Nursing Documentation:  PMH: Agreed, PSxH: Agreed





Nursing Documentation-PMH


Past Medical History:  No History, Except For


Hx Hypertension:  Yes


Hx Asthma:  Yes


Hx Diabetes:  Yes - Borderline





Review of Systems


All Other Systems:  negative except mentioned in HPI





Physical Exam





Vital Signs








  Date Time  Temp Pulse Resp B/P (MAP) Pulse Ox O2 Delivery O2 Flow Rate FiO2


 


11/8/17 12:56 98.1 100 18 134/91 95 Room Air  








Sp02 EP Interpretation:  reviewed, normal


General Appearance:  no apparent distress, alert, GCS 15, non-toxic


Head:  normocephalic, atraumatic


Eyes:  bilateral eye normal inspection, bilateral eye PERRL


ENT:  hearing grossly normal, normal voice


Neck:  full range of motion


Respiratory:  chest non-tender, lungs clear, normal breath sounds, speaking 

full sentences


Cardiovascular #1:  regular rate, rhythm


Gastrointestinal:  normal bowel sounds, non tender, soft, no guarding, no 

rebound


Rectal:  deferred


Genitourinary:  normal inspection, no CVA tenderness


Musculoskeletal:  back normal, gait/station normal, normal range of motion, non-

tender


Neurologic:  alert, oriented x3, responsive, motor strength/tone normal, 

sensory intact, normal gait, speech normal


Psychiatric:  other - Pt has very flattened affect, possible MR.


Skin:  normal color, warm/dry, well hydrated, rash - mild erythema, with 

bilateral labial swelling in the groin.


Lymphatic:  no adenopathy





Medical Decision Making


PA Attestation


Dr. khoury is my supervising Physician whom patient management has been 

discussed with.


Diagnostic Impression:  


 Primary Impression:  


 Vaginitis


 Qualified Codes:  N76.0 - Acute vaginitis


ER Course


32-year-old female presents to the emergency department complaining of itchy 

rash in the vaginal area x2 days denies recent antibiotic use, lesions, 

bleeding or swollen tender lymph nodes.  Patient denies recent unprotected 

intercourse.  Patient does report scant thickened white vaginal DC.  She denies 

abdominal pain/tenderness.  Denies dysuria, hematuria she reports frequency.  

Patient also states that she is diabetic.  Patient also reports pain in the 

right toe that she rates as 7/10 in severity times several months.  Patient 

states that she fractured her toe several months ago and she continues to have 

dull pain.  Patient states she never followed up with orthopedic specialist.  

Patient denies new trauma or fall.  Denies erythema, open wounds or swelling.  

She states her pain is exacerbated upon walking.  Denies numbness tingling or 

loss of sensation or gross motor movements of the extremities, incontinence of 

bowel or bladder. Denies CP, Palpitations, LOC, AMS, dizziness, Changes in 

Vision, Sensation, paresthesias, or a sudden severe headache. 





Ddx considered but are not limited to UTi , Pyelo, STI, Stone, Cystitis, 

vaginal laceration, vaginitis.


Vital signs: are WNL, pt. is afebrile 





H& PE are most consistent with: yeast Vaginitis.


ORDERS:





- UA labs are attached  





ED INTERVENTIONS: None required at this time. 





-Discussed with patient that she will need to followup with orthopedic 

specialist to evaluate her previously fractured toe.  In the meantime will 

treat conservatively. 





DISCHARGE: At this time pt. is stable for d/c to home. Will provide printed 

patient care instructions, and any necessary prescriptions. Care plan and 

follow up instructions have been discussed with the patient prior to discharge.





Labs








Test


  11/8/17


13:06


 


Urine Color Pale yellow 


 


Urine Appearance Clear 


 


Urine pH 6 (4.5-8.0) 


 


Urine Specific Gravity


  1.015


(1.005-1.035)


 


Urine Protein


  Negative


(NEGATIVE)


 


Urine Glucose (UA) 4+ (NEGATIVE) 


 


Urine Ketones 2+ (NEGATIVE) 


 


Urine Occult Blood


  Negative


(NEGATIVE)


 


Urine Nitrite


  Negative


(NEGATIVE)


 


Urine Bilirubin


  Negative


(NEGATIVE)


 


Urine Urobilinogen


  Normal MG/DL


(0.0-1.0)


 


Urine Leukocyte Esterase 1+ (NEGATIVE) 


 


Urine RBC


  2-4 /HPF (0 -


2)


 


Urine WBC


  2-4 /HPF (0 -


2)


 


Urine Squamous Epithelial


Cells Few /LPF


(NONE/OCC)


 


Urine Bacteria


  Few /HPF


(NONE)











Last Vital Signs








  Date Time  Temp Pulse Resp B/P (MAP) Pulse Ox O2 Delivery O2 Flow Rate FiO2


 


11/8/17 12:56 98.1 100 18 134/91 95 Room Air  








Disposition:  HOME, SELF-CARE


Condition:  Stable


Scripts


Fluconazole (FLUCONAZOLE) 100 Mg Tablet


100 MG ORAL DAILY for 5 Days, #5 TAB 0 Refills


   Prov: Evette Lee         11/8/17 


Nystatin* (NYSTATIN*) 15 Gm Cream..g.


1 APPLIC TOPIC THREE TIMES A DAY, #15 GM


   Prov: Evette Lee         11/8/17


Referrals:  


NON PHYSICIAN (PCP)


Patient Instructions:  Vaginitis





Additional Instructions:  


Take medications as directed. 


 ** Follow up with a Primary Care Provider in 3-5 days, even if your symptoms 

have resolved. ** 


--Please review list of primary care clinics, if you do not already have a 

primary care provider





Return sooner to ED if new symptoms occur, or current symptoms become worse. 








- Please note that this Emergency Department Report was dictated using [employer] technology software, occasionally this can lead to 

erroneous entry secondary to interpretation by the dictation equipment.











Evette Lee Nov 8, 2017 13:52

## 2017-11-08 NOTE — EMERGENCY ROOM REPORT
History of Present Illness


General


Chief Complaint:  Skin Rash/Abscess


Source:  Patient, Medical Record





Present Illness


HPI


32-year-old female presents to the emergency department complaining of itchy 

rash in the vaginal area x2 days denies recent antibiotic use, lesions, 

bleeding or swollen tender lymph nodes.  Patient denies recent unprotected 

intercourse.  Patient does report scant thickened white vaginal DC.  She denies 

abdominal pain/tenderness.  Denies dysuria, hematuria she reports frequency.  

Patient also states that she is diabetic.  Patient also reports pain in the 

right toe that she rates as 7/10 in severity times several months.  Patient 

states that she fractured her toe several months ago and she continues to have 

dull pain.  Patient states she never followed up with orthopedic specialist.  

Patient denies new trauma or fall.  Denies erythema, open wounds or swelling.  

She states her pain is exacerbated upon walking.  Denies numbness tingling or 

loss of sensation or gross motor movements of the extremities, incontinence of 

bowel or bladder. Denies CP, Palpitations, LOC, AMS, dizziness, Changes in 

Vision, Sensation, paresthesias, or a sudden severe headache.


Allergies:  


Coded Allergies:  


     PENICILLINS (Verified  Allergy, Severe, Anaphylaxis, 1/3/17)


     PINEAPPLE (Verified  Allergy, Severe, 1/3/17)


     SULFAMETHOXAZOLE (Verified  Allergy, Severe, Anaphylaxis, 1/3/17)


     TRIMETHOPRIM (Verified  Allergy, Severe, Anaphylaxis, 1/3/17)


     LATEX, NATURAL RUBBER (Verified  Allergy, Unknown, 1/3/17)





Patient History


Past Medical History:  see triage record, DM


Past Surgical History:  none


Pertinent Family History:  none


Last Menstrual Period:  07/17


Immunizations:  UTD


Reviewed Nursing Documentation:  PMH: Agreed, PSxH: Agreed





Nursing Documentation-PMH


Past Medical History:  No History, Except For


Hx Hypertension:  Yes


Hx Asthma:  Yes


Hx Diabetes:  Yes - Borderline





Review of Systems


All Other Systems:  negative except mentioned in HPI





Physical Exam





Vital Signs








  Date Time  Temp Pulse Resp B/P (MAP) Pulse Ox O2 Delivery O2 Flow Rate FiO2


 


11/8/17 12:56 98.1 100 18 134/91 95 Room Air  








Sp02 EP Interpretation:  reviewed, normal


General Appearance:  no apparent distress, alert, GCS 15, non-toxic


Head:  normocephalic, atraumatic


Eyes:  bilateral eye normal inspection, bilateral eye PERRL


ENT:  hearing grossly normal, normal voice


Neck:  full range of motion


Respiratory:  chest non-tender, lungs clear, normal breath sounds, speaking 

full sentences


Cardiovascular #1:  regular rate, rhythm


Gastrointestinal:  normal bowel sounds, non tender, soft, no guarding, no 

rebound


Rectal:  deferred


Genitourinary:  normal inspection, no CVA tenderness


Musculoskeletal:  back normal, gait/station normal, normal range of motion, non-

tender


Neurologic:  alert, oriented x3, responsive, motor strength/tone normal, 

sensory intact, normal gait, speech normal


Psychiatric:  other - Pt has very flattened affect, possible MR.


Skin:  normal color, warm/dry, well hydrated, rash - mild erythema, with 

bilateral labial swelling in the groin.


Lymphatic:  no adenopathy





Medical Decision Making


PA Attestation


Dr. khoury is my supervising Physician whom patient management has been 

discussed with.


Diagnostic Impression:  


 Primary Impression:  


 Vaginitis


 Qualified Codes:  N76.0 - Acute vaginitis


ER Course


32-year-old female presents to the emergency department complaining of itchy 

rash in the vaginal area x2 days denies recent antibiotic use, lesions, 

bleeding or swollen tender lymph nodes.  Patient denies recent unprotected 

intercourse.  Patient does report scant thickened white vaginal DC.  She denies 

abdominal pain/tenderness.  Denies dysuria, hematuria she reports frequency.  

Patient also states that she is diabetic.  Patient also reports pain in the 

right toe that she rates as 7/10 in severity times several months.  Patient 

states that she fractured her toe several months ago and she continues to have 

dull pain.  Patient states she never followed up with orthopedic specialist.  

Patient denies new trauma or fall.  Denies erythema, open wounds or swelling.  

She states her pain is exacerbated upon walking.  Denies numbness tingling or 

loss of sensation or gross motor movements of the extremities, incontinence of 

bowel or bladder. Denies CP, Palpitations, LOC, AMS, dizziness, Changes in 

Vision, Sensation, paresthesias, or a sudden severe headache. 





Ddx considered but are not limited to UTi , Pyelo, STI, Stone, Cystitis, 

vaginal laceration, vaginitis.


Vital signs: are WNL, pt. is afebrile 





H& PE are most consistent with: yeast Vaginitis.


ORDERS:





- UA labs are attached  





ED INTERVENTIONS: None required at this time. 





-Discussed with patient that she will need to followup with orthopedic 

specialist to evaluate her previously fractured toe.  In the meantime will 

treat conservatively. 





DISCHARGE: At this time pt. is stable for d/c to home. Will provide printed 

patient care instructions, and any necessary prescriptions. Care plan and 

follow up instructions have been discussed with the patient prior to discharge.





Labs








Test


  11/8/17


13:06


 


Urine Color Pale yellow 


 


Urine Appearance Clear 


 


Urine pH 6 (4.5-8.0) 


 


Urine Specific Gravity


  1.015


(1.005-1.035)


 


Urine Protein


  Negative


(NEGATIVE)


 


Urine Glucose (UA) 4+ (NEGATIVE) 


 


Urine Ketones 2+ (NEGATIVE) 


 


Urine Occult Blood


  Negative


(NEGATIVE)


 


Urine Nitrite


  Negative


(NEGATIVE)


 


Urine Bilirubin


  Negative


(NEGATIVE)


 


Urine Urobilinogen


  Normal MG/DL


(0.0-1.0)


 


Urine Leukocyte Esterase 1+ (NEGATIVE) 


 


Urine RBC


  2-4 /HPF (0 -


2)


 


Urine WBC


  2-4 /HPF (0 -


2)


 


Urine Squamous Epithelial


Cells Few /LPF


(NONE/OCC)


 


Urine Bacteria


  Few /HPF


(NONE)











Last Vital Signs








  Date Time  Temp Pulse Resp B/P (MAP) Pulse Ox O2 Delivery O2 Flow Rate FiO2


 


11/8/17 12:56 98.1 100 18 134/91 95 Room Air  








Disposition:  HOME, SELF-CARE


Condition:  Stable


Scripts


Fluconazole (FLUCONAZOLE) 100 Mg Tablet


100 MG ORAL DAILY for 5 Days, #5 TAB 0 Refills


   Prov: Evette Lee         11/8/17 


Nystatin* (NYSTATIN*) 15 Gm Cream..g.


1 APPLIC TOPIC THREE TIMES A DAY, #15 GM


   Prov: Evette Lee         11/8/17


Referrals:  


NON PHYSICIAN (PCP)


Patient Instructions:  Vaginitis





Additional Instructions:  


Take medications as directed. 


 ** Follow up with a Primary Care Provider in 3-5 days, even if your symptoms 

have resolved. ** 


--Please review list of primary care clinics, if you do not already have a 

primary care provider





Return sooner to ED if new symptoms occur, or current symptoms become worse. 








- Please note that this Emergency Department Report was dictated using [employer] technology software, occasionally this can lead to 

erroneous entry secondary to interpretation by the dictation equipment.











Evette Lee Nov 8, 2017 13:52

## 2018-01-03 NOTE — EMERGENCY ROOM REPORT
History of Present Illness


General


Chief Complaint:  Abdominal Pain


Source:  Patient, Medical Record





Present Illness


HPI


The patient is a 32-year-old female with a history of fibroids and DM 

presenting for vaginal bleeding for the past month.  Is also experiencing lower 

abdominal pain described as an 8/10 dull ache.  Does not radiate.  She has not 

been seen by OBGYN.  She is not taking any pain medications.  She denies other 

symptoms including nausea, vomiting, fever, chills, fatigue, vaginal discharge, 

SOB


Allergies:  


Coded Allergies:  


     PENICILLINS (Verified  Allergy, Severe, Anaphylaxis, 1/3/17)


     PINEAPPLE (Verified  Allergy, Severe, 1/3/17)


     SULFAMETHOXAZOLE (Verified  Allergy, Severe, Anaphylaxis, 1/3/17)


     TRIMETHOPRIM (Verified  Allergy, Severe, Anaphylaxis, 1/3/17)


     LATEX, NATURAL RUBBER (Verified  Allergy, Unknown, 1/3/17)





Patient History


Past Medical History:  see triage record


Pertinent Family History:  none


Last Menstrual Period:  on period


Reviewed Nursing Documentation:  PMH: Agreed, PSxH: Agreed





Nursing Documentation-PMH


Past Medical History:  No History, Except For


Hx Cardiac Problems:  No


Hx Hypertension:  Yes


Hx Pacemaker:  No


Hx Asthma:  Yes


Hx COPD:  No


Hx Diabetes:  Yes - Borderline


Hx Cancer:  No


Hx Gastrointestinal Problems:  No


Hx Dialysis:  No


History Of Psychiatric Problem:  No


Hx Neurological Problems:  No


Hx Cerebrovascular Accident:  No


Hx Seizures:  No





Review of Systems


All Other Systems:  negative except mentioned in HPI





Physical Exam





Vital Signs








  Date Time  Temp Pulse Resp B/P (MAP) Pulse Ox O2 Delivery O2 Flow Rate FiO2


 


1/3/18 12:10 98.1 93 16 110/80 97 Room Air  








Sp02 EP Interpretation:  reviewed, normal


General Appearance:  no apparent distress, alert, GCS 15, non-toxic


Head:  normocephalic, atraumatic


Eyes:  bilateral eye normal inspection, bilateral eye PERRL


ENT:  hearing grossly normal, normal pharynx, no angioedema, normal voice


Neck:  full range of motion, supple/symm/no masses


Respiratory:  chest non-tender, lungs clear, normal breath sounds, speaking 

full sentences


Gastrointestinal:  normal bowel sounds, non tender, soft, non-distended, no 

guarding, no rebound


Genitourinary:  normal inspection, no CVA tenderness


Musculoskeletal:  back normal, gait/station normal, normal range of motion, non-

tender


Neurologic:  alert, oriented x3, responsive, motor strength/tone normal, 

sensory intact, speech normal


Psychiatric:  judgement/insight normal, memory normal, mood/affect normal, no 

suicidal/homicidal ideation


Skin:  normal color, no rash, warm/dry, well hydrated





Medical Decision Making


PA Attestation


Dr. Walton is my supervising physician. Patient management was discussed with 

my supervising physician


Diagnostic Impression:  


 Primary Impression:  


 DUB (dysfunctional uterine bleeding)


 Additional Impression:  


 Hyperglycemia


ER Course


The patient is a 32-year-old female with a history of fibroids presenting for 

vaginal bleeding for the past month.





Differential diagnosis considered but not limited to: UTI, fibroids, DUB, 

vaginitis, pyelonephritis, pregnancy, anemia





PE: NAD. Afebrile. 


Abdomen is soft.  Nontender.


Skin is warm and dry.  No pallor





Labs:


Significant for hyperglycemia. H&H stable. 


UA: bleeding. No signs of infection





The patient is given subcutaneous insulin


And will be discharged home.  She is given prescription for pain medication and 

an increase of her glipizide. 


To follow up with her primary doctor and discuss possibility for insulin 

therapy.  ER precautions are given





Laboratory Tests








Test


  1/3/18


13:00 1/3/18


13:40


 


Urine Color Pale yellow   


 


Urine Appearance


  Slightly


cloudy 


 


 


Urine pH 5 (4.5-8.0)   


 


Urine Specific Gravity


  1.010


(1.005-1.035) 


 


 


Urine Protein


  1+ (NEGATIVE)


H 


 


 


Urine Glucose (UA)


  4+ (NEGATIVE)


H 


 


 


Urine Ketones


  3+ (NEGATIVE)


H 


 


 


Urine Occult Blood


  5+ (NEGATIVE)


H 


 


 


Urine Nitrite


  Negative


(NEGATIVE) 


 


 


Urine Bilirubin


  Negative


(NEGATIVE) 


 


 


Urine Urobilinogen


  Normal MG/DL


(0.0-1.0) 


 


 


Urine Leukocyte Esterase


  1+ (NEGATIVE)


H 


 


 


Urine RBC


  60-80 /HPF (0


- 2)  H 


 


 


Urine WBC


  2-4 /HPF (0 -


2) 


 


 


Urine Squamous Epithelial


Cells Few /LPF


(NONE/OCC) 


 


 


Urine Bacteria


  Few /HPF


(NONE) 


 


 


Urine HCG, Qualitative Negative   


 


White Blood Count


  


  9.8 K/UL


(4.8-10.8)


 


Red Blood Count


  


  4.57 M/UL


(4.20-5.40)


 


Hemoglobin


  


  14.4 G/DL


(12.0-16.0)


 


Hematocrit


  


  44.3 %


(37.0-47.0)


 


Mean Corpuscular Volume  97 FL (80-99)  


 


Mean Corpuscular Hemoglobin


  


  31.5 PG


(27.0-31.0)  H


 


Mean Corpuscular Hemoglobin


Concent 


  32.5 G/DL


(32.0-36.0)


 


Red Cell Distribution Width


  


  11.1 %


(11.6-14.8)  L


 


Platelet Count


  


  301 K/UL


(150-450)


 


Mean Platelet Volume


  


  7.6 FL


(6.5-10.1)


 


Neutrophils (%) (Auto)


  


  58.6 %


(45.0-75.0)


 


Lymphocytes (%) (Auto)


  


  34.0 %


(20.0-45.0)


 


Monocytes (%) (Auto)


  


  5.3 %


(1.0-10.0)


 


Eosinophils (%) (Auto)


  


  1.0 %


(0.0-3.0)


 


Basophils (%) (Auto)


  


  1.1 %


(0.0-2.0)


 


Prothrombin Time  Pending  


 


Prothrombin Time INR  Pending  


 


PTT  Pending  


 


Sodium Level


  


  135 MMOL/L


(136-145)  L


 


Potassium Level


  


  4.5 MMOL/L


(3.5-5.1)


 


Chloride Level


  


  99 MMOL/L


()


 


Carbon Dioxide Level


  


  25 MMOL/L


(21-32)


 


Anion Gap


  


  11 mmol/L


(5-15)


 


Blood Urea Nitrogen


  


  8 mg/dL (7-18)


 


 


Creatinine


  


  0.8 MG/DL


(0.55-1.30)


 


Estimate Glomerular


Filtration Rate 


  > 60 mL/min


(>60)


 


Glucose Level


  


  413 MG/DL


()  H


 


Calcium Level


  


  8.6 MG/DL


(8.5-10.1)


 


Total Bilirubin


  


  0.4 MG/DL


(0.2-1.0)


 


Aspartate Amino Transferase


(AST) 


  24 U/L (15-37)


 


 


Alanine Aminotransferase (ALT)


  


  45 U/L (12-78)


 


 


Alkaline Phosphatase


  


  75 U/L


()


 


Total Protein


  


  8.5 G/DL


(6.4-8.2)  H


 


Albumin


  


  3.9 G/DL


(3.4-5.0)


 


Globulin  4.6 g/dL  


 


Albumin/Globulin Ratio


  


  0.8 (1.0-2.7)


L


 


Lipase


  


  181 U/L


()








Lab Results Impression


Significant for hyperglycemia. H&H stable. 


UA: bleeding. No signs of infection





Last Vital Signs








  Date Time  Temp Pulse Resp B/P (MAP) Pulse Ox O2 Delivery O2 Flow Rate FiO2


 


1/3/18 15:10 97.9 82 18 128/87 100 Room Air  








Status:  improved


Disposition:  HOME, SELF-CARE


Condition:  Improved


Scripts


Naproxen* (NAPROXEN*) 500 Mg Tablet


500 MG ORAL TWICE A WEEK, #60 TAB 0 Refills


   Prov: JUANA ROBLES         1/3/18 


Glipizide* (GLUCOTROL*) 10 Mg Tablet


10 MG ORAL ACBREAKFAST, #30 TAB 0 Refills


   Prov: JUANA ROBLES         1/3/18


Referrals:  


NON PHYSICIAN (PCP)


Patient Instructions:  Abnormal Uterine Bleeding, Easy-to-Read





Additional Instructions:  


I discussed my findings with the patient. All questions and concerns have been 

answered. Treatment and medication compliance have been addressed. I advised 

the patient that they need to follow up with PMD in 3-5 days. Return to ED if 

symptoms worsen, new symptoms arise, or if needed for any reason. Patient 

verbalized understanding of discharge instructions.











JUANA ROBLES Joel 3, 2018 22:35

## 2018-03-21 NOTE — EMERGENCY ROOM REPORT
History of Present Illness


General


Chief Complaint:  Female Urogenital Problems


Source:  Patient





Present Illness


HPI


32-year-old female, presenting with vaginal discharge for 5 days.  White/gray.  

Also some dysuria.  No fever no chills.  No abdominal pain.  States that she is 

sexually active with her partner, only uses condoms intermittently


Allergies:  


Coded Allergies:  


     PENICILLINS (Verified  Allergy, Severe, Anaphylaxis, 1/3/17)


     PINEAPPLE (Verified  Allergy, Severe, 1/3/17)


     SULFAMETHOXAZOLE (Verified  Allergy, Severe, Anaphylaxis, 1/3/17)


     TRIMETHOPRIM (Verified  Allergy, Severe, Anaphylaxis, 1/3/17)


     LATEX, NATURAL RUBBER (Verified  Allergy, Unknown, 1/3/17)





Patient History


Past Medical History:  see triage record


Past Surgical History:  none


Pertinent Family History:  none


Last Menstrual Period:  dec [id number]


Pregnant Now:  No


:  0


Reviewed Nursing Documentation:  PMH: Agreed, PSxH: Agreed





Nursing Documentation-PMH


Past Medical History:  No History, Except For


Hx Cardiac Problems:  No


Hx Hypertension:  Yes


Hx Pacemaker:  No


Hx Asthma:  Yes


Hx COPD:  No


Hx Diabetes:  Yes - Borderline


Hx Cancer:  No


Hx Gastrointestinal Problems:  No


Hx Dialysis:  No


Hx Neurological Problems:  No


Hx Cerebrovascular Accident:  No


Hx Seizures:  No





Review of Systems


All Other Systems:  negative except mentioned in HPI





Physical Exam





Vital Signs








  Date Time  Temp Pulse Resp B/P (MAP) Pulse Ox O2 Delivery O2 Flow Rate FiO2


 


3/21/18 10:08 99.0 100 20 125/82 95 Room Air  





 99.0       








Sp02 EP Interpretation:  reviewed, normal


General Appearance:  normal inspection, well appearing, no apparent distress, 

alert, GCS 15, non-toxic


Head:  normocephalic, atraumatic


Eyes:  bilateral eye normal inspection, bilateral eye PERRL, bilateral eye EOMI


ENT:  normal ENT inspection, normal pharynx, normal voice, moist mucus membranes


Neck:  normal inspection, full range of motion, supple


Respiratory:  normal inspection, lungs clear, normal breath sounds, no 

respiratory distress, no retraction, no wheezing, speaking full sentences, 

chest symmetrical


Cardiovascular #1:  normal inspection, regular rate, rhythm, no edema, normal 

capillary refill


Cardiovascular #2:  2+ radial (R), 2+ radial (L)


Gastrointestinal:  normal inspection, non tender, soft, non-distended, no 

guarding


Genitourinary:  other - THICK YELLOW/WHITE DC


Musculoskeletal:  normal inspection, back normal, normal range of motion, non-

tender


Neurologic:  normal inspection, alert, oriented x3, responsive, motor strength/

tone normal, sensory intact, normal gait, speech normal


Psychiatric:  normal inspection, judgement/insight normal, memory normal


Skin:  normal inspection, normal color, no rash, warm/dry, well hydrated, 

normal turgor





Medical Decision Making


Diagnostic Impression:  


 Primary Impression:  


 Vaginal discharge


 Additional Impression:  


 Concern about STD in female without diagnosis


ER Course


32-year-old female with vaginal discharge





DDX:


STD, UTI





Plan:


Empirically treat for STD, UA





ER course:


Patient has remained stable during ED stay.


Given ceftriaxone and azithromycin





Disposition:


Patient is to be discharged to home.


Instructed to refrain from sexual activity, also instructed to follow-up with 

her doctor in 2 weeks for STD testing





Please note that this Emergency Department Report was dictated using [employer] technology software, occasionally this can lead to 

erroneous entry secondary to interpretation by the dictation equipment





Laboratory Tests








Test


  3/21/18


10:30


 


Urine Color Yellow  


 


Urine Appearance Clear  


 


Urine pH 6 (4.5-8.0)  


 


Urine Specific Gravity


  1.020


(1.005-1.035)


 


Urine Protein


  3+ (NEGATIVE)


H


 


Urine Glucose (UA)


  3+ (NEGATIVE)


H


 


Urine Ketones


  3+ (NEGATIVE)


H


 


Urine Occult Blood


  4+ (NEGATIVE)


H


 


Urine Nitrite


  Negative


(NEGATIVE)


 


Urine Bilirubin


  Negative


(NEGATIVE)


 


Urine Urobilinogen


  Normal MG/DL


(0.0-1.0)


 


Urine Leukocyte Esterase


  3+ (NEGATIVE)


H


 


Urine RBC


  5-10 /HPF (0 -


2)  H


 


Urine WBC


  30-40 /HPF (0


- 2)  H


 


Urine Squamous Epithelial


Cells Few /LPF


(NONE/OCC)


 


Urine Bacteria


  Few /HPF


(NONE)


 


Urine HCG, Qualitative


  Negative


(NEGATIVE)











Last Vital Signs








  Date Time  Temp Pulse Resp B/P (MAP) Pulse Ox O2 Delivery O2 Flow Rate FiO2


 


3/21/18 10:08 99.0 100 20 125/82 95 Room Air  





 99.0       








Disposition:  HOME, SELF-CARE


Condition:  Stable


Patient Instructions:  Sexually Transmitted Disease, Easy-to-Read





Additional Instructions:  


Please refrain from all sexual activity, please follow-up with your doctor in 2 

weeks for retesting











Anabel Walton M.D. Mar 21, 2018 11:00

## 2018-06-28 NOTE — CONSULTATION
DATE OF CONSULTATION:  06/27/2018



INFECTIOUS DISEASE CONSULTATION



CONSULTING PHYSICIAN:  Anna Lopez M.D.



REQUESTING PHYSICIAN:  Ave Arceo M.D.



REASON FOR CONSULTATION:  Acute pyelonephritis with possible sepsis in a

patient with significant allergy to penicillin.



HISTORY OF PRESENT ILLNESS:  The patient is a 33-year-old female with past

medical history of diabetes, borderline and hypertension, presented to

[facility] with left side abdominal pain for 5 days associated

with fever and chills.  She also developed nausea and vomiting with urine

urgency and incontinence.  She had fever and chills for couple of days and

vomited.  No diarrhea.  No recent travel or sick contact.  Her left flank

pain was 10/10 in intensity, deep, sharp pain localized on the left side

of her abdomen and left flank area.  The patient was found to have

significant urine tract infection with CT scan finding of left kidney

swelling due to pyelonephritis.  The patient was started on vancomycin and

cefepime by the admitting physician and Infectious Disease consultation

was requested for further evaluation and management.



REVIEW OF SYSTEMS:  A 14-point of system reviewed were all negative apart

from the one I mentioned above.



PAST MEDICAL HISTORY:  Significant for diabetes and hypertension.



PAST SURGICAL HISTORY:  Not on record.



FAMILY HISTORY:  Unable to obtain.



SOCIAL HISTORY:  The patient lives at home.  No recent drugs, tobacco, or

alcohol.  She is unemployed.



ALLERGIES:  She has significant allergy to penicillin with throat swelling

and Bactrim the same reaction with anaphylaxis.  She is also allergic to

pineapple and latex.



MEDICATIONS:  Currently, she is on cefepime and vancomycin.  For the rest

of her medications, please refer to MAR.



LABORATORY AND DIAGNOSTIC DATA:  White count of 17.3, hemoglobin of 12.2

and platelet count of 274.  BUN of 17 and creatinine of 1.7.  Urinalysis

showed +3 leukocyte esterase, WBC too numerous to count, and moderate

amount of bacteria.  Urine HCG negative.  Imaging, CT scan of the abdomen

without contrast showed enlarged left kidney with inflammation, suspect

pyelonephritis, correlate clinically.  No abscess.  Hepatomegaly with

fatty infiltration.



An ultrasound of the abdomen complete showed hepatomegaly and fatty

infiltration.



PHYSICAL EXAMINATION:

GENERAL:  A young female, obese, lying in bed, awake, alert, not in

distress.

VITAL SIGNS:  Temperature 100.1 degrees, pulse 111, respirations 20, blood

pressure 115/67 and saturation 100% on room air.

HEENT:  Normocephalic and atraumatic.  Moist oral mucosa.

NECK:  Supple.  No lymphadenopathy.

CARDIOVASCULAR:  Regular rate and rhythm.  No murmur.

LUNGS:  Clear bilaterally.  Diminished breathing sounds at the

bases.

ABDOMEN:  Soft, nontender, and nondistended.  Normal bowel sounds.  No

hepatosplenomegaly.  Left flank tenderness with CVA tenderness.

EXTREMITY:  No edema or cyanosis.



ASSESSMENT AND RECOMMENDATION:

1. Acute pyelonephritis.  We will stop cefepime due to significant

allergy to penicillin and start levofloxacin IV empiric coverage pending

urine culture.

2. Sepsis with leukocytosis due to the above.  Continue vancomycin for

now and switch cefepime to Levaquin empiric coverage pending blood culture

results.  We will deescalate antibiotics based on the results.

3. Acute kidney failure due to the above.  Continue hydration with IV

fluids.  Monitor renal function.

4. Diabetes.  Recommend tight glycemic control to keep blood glucose

between 100 to 140.



Thank you for the consult.  ID will continue to follow end dictation.









  ______________________________________________

  Anna Lopez M.D.





DR:  KARI

D:  06/28/2018 14:06

T:  06/28/2018 16:40

JOB#:  [id number]

CC:

## 2018-06-28 NOTE — HISTORY AND PHYSICAL REPORT
DATE OF ADMISSION:  06/28/2018



SOURCE OF INFORMATION:  The patient and EMR.



HISTORY OF PRESENT ILLNESS:  The patient is a 33-year-old 

female with a history of prediabetes.  The patient presented with fever

and chills and the abdominal pain.  She was found to have urinary

infection, pyelonephritis.  Initial vital signs proved.  The patient has a

tachycardia of 120 and temperature of 102.  At the time of evaluation, the

patient denies any nausea, vomitus, diarrhea, or constipation.  Positive

for abdominal pain.



ALLERGIES:  To latex, penicillin, triamterene, and _____.



FAMILY HISTORY:  Reviewed and noncontributory.



SOCIAL HISTORY:  The patient reported that lives with the family member.

Denies history of illicit drug abuse, smoking, or alcohol abuse.  She has

a fiance.



PHYSICAL EXAMINATION:

VITAL SIGNS:  Blood pressure 120/80, temperature 98.2, pulse rate 88,

respiratory rate 18, and pulse ox 94% on room air.

HEAD AND NECK:  Atraumatic and normocephalic.

CHEST:  Clear to auscultation.

HEART:  S1 and S2.  Regular rate and rhythm.

ABDOMEN:  Positive for tenderness on deep palpation.  Negative for any

rebound tenderness.

MUSCULOSKELETAL:  No gross focal motor deficit.

NEUROLOGIC:  The patient is awake, alert, and oriented x3.



LABORATORY AND DIAGNOSTIC DATA:  Labs, dated June 28, 2018 shows WBC 17.3,

hemoglobin of 12.2, and platelet of 272.  Sodium 128, potassium 3.8, BUN

17, and creatinine 1.7.



Imaging is pending.



ASSESSMENT:

1. Acute pyelonephritis.

2. Prediabetes.

3. GI and DVT prophylaxis.



PLAN OF CARE:  We will continue with the empiric antibiotic regimen.  We

will add sliding scale insulin.  Infectious Disease consulted.  Continue

current management.









  ______________________________________________

  Ave Arceo M.D.





DR:  JHOANA

D:  06/28/2018 08:33

T:  06/29/2018 01:54

JOB#:  [id number]

CC:

## 2018-06-28 NOTE — DIAGNOSTIC IMAGING REPORT
Indication:Abdominal pain. Flank pain. Nausea vomiting. Elevated creatinine

 

Technique: Grayscale and duplex Doppler imaging of the abdomen performed.

 

Comparison: None

 

Findings:

 

The liver is enlarged slightly hyperechoic. The demonstrated part of the pancreas,

gallbladder, aorta and IVC, both kidneys, spleen appear unremarkable. No abscess or

abnormal fluid collections seen. There is no biliary ductal dilatation identified.

Doppler evaluation of the main portal vein shows patency. There is no ascites. No

hydronephrosis seen. CBD is 3.6 mm.

 

Impression:

 

Hepatomegaly. Fatty infiltration.

## 2018-06-28 NOTE — DIAGNOSTIC IMAGING REPORT
Indication: Abdominal pain

 

Technique: Continuous helical transaxial imaging of the abdomen and pelvis was

obtained from the lung bases to the pubic symphysis. No intravenous contrast was

administered. Coronal 2-D reformats were also obtained. Automatic Exposure Control

was utilized.

 

 

Total Dose length Product (DLP):  1138.05 mGycm

 

CT Dose Index Volume (CTDIvol):   19.75 mGy

 

Comparison: none

 

Findings: The left kidney is enlarged and there is perinephric stranding. There is no

hydronephrosis demonstrated. No renal or urinary tract stones are identified. The

wall the urinary bladder may be slightly thickened. The right kidney is unremarkable.

Findings may be due to pyelonephritis of the left kidney. Correlate clinically.

 

The gallbladder is slightly dense which may be due to underlying stones or sludge.

Liver attenuation is slightly low in the liver is prominent in size measuring 23 cm.

There is no free fluid. Uterus noted. Appendix is normal.

 

IMPRESSION:

 

Enlarged left kidney with inflammatory changes. Suspect pyelonephritis. Correlate

clinically.

 

No abscess.

 

Hepatomegaly with fatty infiltration.

 

[facility] has communicated the preliminary results to the Emergency

Department.  Their findings are largely concordant with this report.

 

 

The CT scanner at [facility] is accredited by the American College of

Radiology and the scans are performed using dose optimization techniques as

appropriate to a performed exam including Automatic Exposure control.

## 2018-06-28 NOTE — HISTORY & PHYSICAL
History and Physical


History & Physicial


seen and examined. Full Dictation is completed











Ave Arceo MD Jun 28, 2018 08:34

## 2018-06-28 NOTE — EMERGENCY ROOM REPORT
History of Present Illness


General


Chief Complaint:  Abdominal Pain


Source:  Patient





Present Illness


HPI


Is a 33-year-old female with a history of borderline diabetes and high blood 

pressure.  She presents with chief complaint of abdominal pain.  Onset for the 

last 5 days.  Also with fever and chills.  She has nausea and vomiting.  No 

diarrhea.  Also with urinary frequency and incontinence.  Has fever and chills 

for last few days.  Vomiting is nonbloody nonbilious.  No stool for the last 

few days.  Felt weak.  Pain is 10 out of 10.  Worse with palpation.  Unable to 

keep anything down.


Allergies:  


Coded Allergies:  


     PENICILLINS (Verified  Allergy, Severe, Anaphylaxis, 6/28/18)


     PINEAPPLE (Verified  Allergy, Severe, 6/28/18)


     SULFAMETHOXAZOLE (Verified  Allergy, Severe, Anaphylaxis, 6/28/18)


     TRIMETHOPRIM (Verified  Allergy, Severe, Anaphylaxis, 6/28/18)


     LATEX, NATURAL RUBBER (Verified  Allergy, Unknown, 6/28/18)


Uncoded Allergies:  


     MUSHROOMS (Allergy, Unknown, 6/28/18)





Patient History


Past Medical History:  see triage record, old chart reviewed, DM, HTN


Past Surgical History:  other


Pertinent Family History:  none


Social History:  Denies: smoking


Last Menstrual Period:  now


Pregnant Now:  No


Immunizations:  other


Reviewed Nursing Documentation:  PMH: Agreed; PSxH: Agreed





Nursing Documentation-PMH


Hx Cardiac Problems:  No


Hx Hypertension:  Yes


Hx Pacemaker:  No


Hx Asthma:  Yes


Hx COPD:  No


Hx Diabetes:  Yes - Borderline


Hx Cancer:  No


Hx Gastrointestinal Problems:  No


Hx Dialysis:  No


Hx Neurological Problems:  No


Hx Cerebrovascular Accident:  No


Hx Seizures:  No





Review of Systems


Constitutional:  Reports: sweats, fever


Eye:  Denies: eye pain, blurred vision


ENT:  Denies: ear pain, nose congestion, throat swelling


Respiratory:  Denies: cough, shortness of breath


Cardiovascular:  Denies: chest pain, palpitations


Gastrointestinal:  Reports: abdominal pain, nausea, vomiting; Denies: diarrhea


Genitourinary:  Reports: incontinence


Musculoskeletal:  Denies: back pain, joint pain


Skin:  Denies: rash


Neurological:  Denies: headache, numbness


Endocrine:  Denies: increased thirst, increased urine


Hematologic/Lymphatic:  Denies: easy bruising


All Other Systems:  negative except mentioned in HPI





Physical Exam





Vital Signs








  Date Time  Temp Pulse Resp B/P (MAP) Pulse Ox O2 Delivery O2 Flow Rate FiO2


 


6/28/18 02:44 102.8 123 20 105/69 94 Room Air  





 102.7       





vitals with fever


Sp02 EP Interpretation:  reviewed, normal


General Appearance:  no apparent distress, alert, obese, other - ill-appearing


Head:  normocephalic, atraumatic


Eyes:  bilateral eye PERRL, bilateral eye EOMI


ENT:  hearing grossly normal, normal pharynx


Neck:  full range of motion, supple, no meningismus


Respiratory:  chest non-tender, lungs clear, normal breath sounds


Cardiovascular #1:  regular rate, rhythm, no murmur


Gastrointestinal:  normal bowel sounds, no mass, no organomegaly, no bruit, non-

distended, tenderness - diffuse abdominal pain but mostly right upper quadrant


Musculoskeletal:  back normal, gait/station normal, normal range of motion


Neurologic:  alert, oriented x3


Psychiatric:  mood/affect normal


Skin:  warm/dry





Medical Decision Making


Diagnostic Impression:  


 Primary Impression:  


 Sepsis


 Qualified Codes:  A41.9 - Sepsis, unspecified organism


 Additional Impressions:  


 Pyelonephritis


 Hyperglycemia due to type 2 diabetes mellitus


 Qualified Codes:  E11.65 - Type 2 diabetes mellitus with hyperglycemia


 MARGOTH (acute kidney injury)


 Dehydration


ER Course


This patient presents with sepsis from pyelonephritis.  Antibiotic started.  She

's also is dehydrated with evidence of acute kidney injury.  Her glucose also 

very elevated.  No DKA.  No meningitis or acute abdomen.  No obstruction.  

Patient felt better now.  We'll admit for IV antibiotics.  I discussed the case 

with Dr. Arceo who will admit.





Laboratory Tests








Test


  6/28/18


03:50 6/28/18


04:00


 


Prothrombin Time


  9.6 SEC


(9.30-11.50) 


 


 


Prothromb Time International


Ratio 0.9 (0.9-1.1)  


  


 


 


Activated Partial


Thromboplast Time 34 SEC (23-33)


H 


 


 


Urine Color Red   


 


Urine Appearance Cloudy   


 


Urine pH 6.5 (4.5-8.0)   


 


Urine Specific Gravity


  1.010


(1.005-1.035) 


 


 


Urine Protein


  3+ (NEGATIVE)


H 


 


 


Urine Glucose (UA)


  4+ (NEGATIVE)


H 


 


 


Urine Ketones


  2+ (NEGATIVE)


H 


 


 


Urine Occult Blood


  5+ (NEGATIVE)


H 


 


 


Urine Nitrite


  Positive


(NEGATIVE)  H 


 


 


Urine Bilirubin


  Negative


(NEGATIVE) 


 


 


Urine Urobilinogen


  Normal MG/DL


(0.0-1.0) 


 


 


Urine Leukocyte Esterase


  3+ (NEGATIVE)


H 


 


 


Urine RBC


  Tntc /HPF (0 -


2)  H 


 


 


Urine WBC


  Tntc /HPF (0 -


2)  H 


 


 


Urine Squamous Epithelial


Cells Few /LPF


(NONE/OCC) 


 


 


Urine Bacteria


  Moderate /HPF


(NONE)  H 


 


 


Urine HCG, Qualitative


  Negative


(NEGATIVE) 


 


 


Sodium Level


  128 MMOL/L


(136-145)  L 


 


 


Potassium Level


  3.6 MMOL/L


(3.5-5.1) 


 


 


Chloride Level


  92 MMOL/L


()  L 


 


 


Carbon Dioxide Level


  24 MMOL/L


(21-32) 


 


 


Anion Gap


  12 mmol/L


(5-15) 


 


 


Blood Urea Nitrogen


  17 mg/dL


(7-18) 


 


 


Creatinine


  1.7 MG/DL


(0.55-1.30)  H 


 


 


Estimat Glomerular Filtration


Rate 41.9 mL/min


(>60) 


 


 


Glucose Level


  498 MG/DL


()  H 


 


 


Lactic Acid Level


  1.60 mmol/L


(0.4-2.0) 


 


 


Calcium Level


  9.8 MG/DL


(8.5-10.1) 


 


 


Total Bilirubin


  0.5 MG/DL


(0.2-1.0) 


 


 


Aspartate Amino Transf


(AST/SGOT) 16 U/L (15-37)


  


 


 


Alanine Aminotransferase


(ALT/SGPT) 24 U/L (12-78)


  


 


 


Alkaline Phosphatase


  113 U/L


() 


 


 


Total Creatine Kinase


  42 U/L


() 


 


 


Troponin I


  0.000 ng/mL


(0.000-0.056) 


 


 


Total Protein


  8.9 G/DL


(6.4-8.2)  H 


 


 


Albumin


  2.6 G/DL


(3.4-5.0)  L 


 


 


Globulin 6.3 g/dL   


 


Albumin/Globulin Ratio


  0.4 (1.0-2.7)


L 


 


 


White Blood Count


  


  17.3 K/UL


(4.8-10.8)  H


 


Red Blood Count


  


  4.03 M/UL


(4.20-5.40)  L


 


Hemoglobin


  


  12.2 G/DL


(12.0-16.0)


 


Hematocrit


  


  37.7 %


(37.0-47.0)


 


Mean Corpuscular Volume  94 FL (80-99)  


 


Mean Corpuscular Hemoglobin


  


  30.3 PG


(27.0-31.0)


 


Mean Corpuscular Hemoglobin


Concent 


  32.3 G/DL


(32.0-36.0)


 


Red Cell Distribution Width


  


  11.7 %


(11.6-14.8)


 


Platelet Count


  


  274 K/UL


(150-450)


 


Mean Platelet Volume


  


  7.3 FL


(6.5-10.1)


 


Neutrophils (%) (Auto)


  


  82.1 %


(45.0-75.0)  H


 


Lymphocytes (%) (Auto)


  


  9.8 %


(20.0-45.0)  L


 


Monocytes (%) (Auto)


  


  7.6 %


(1.0-10.0)


 


Eosinophils (%) (Auto)


  


  0.1 %


(0.0-3.0)


 


Basophils (%) (Auto)


  


  0.5 %


(0.0-2.0)








Lab Results Impression


labs with elevated WBC, creatinine and glucose


CT/MRI/US Diagnostic Results


CT/MRI/US Diagnostic Results :  


   Imaging Test Ordered:  CT abdomen and pelvis


   Impression


Read by radiologist.  Left kidney edematous with perinephric fat stranding.  

Normal appendix.





Last Vital Signs








  Date Time  Temp Pulse Resp B/P (MAP) Pulse Ox O2 Delivery O2 Flow Rate FiO2


 


6/28/18 02:44 102.8 123 20 105/69 94 Room Air  





 102.7       








Status:  improved


Disposition:  ADMITTED AS INPATIENT


Condition:  Serious











TREMAINE HERRING M.D. Jun 28, 2018 03:01

## 2018-06-28 NOTE — INFECTIOUS DISEASES PROG NOTE
Assessment/Plan


Problems:  


(1) Pyelonephritis


Assessment & Plan:  continue cefepime pending urine culture 





(2) Sepsis


Assessment & Plan:  with leukocytosis due to the above , continue vancomycin 

and cefepime pending blood culture 





(3) MARGOTH (acute kidney injury)


Assessment & Plan:  due to the above, continue IVF for hydration, monitor renal 

function 





(4) Diabetes mellitus


Assessment & Plan:  recommend tight glycemic control to keep blood glucose 

between 100-140 








Subjective


Allergies:  


Coded Allergies:  


     PENICILLINS (Verified  Allergy, Severe, Anaphylaxis, 6/28/18)


     PINEAPPLE (Verified  Allergy, Severe, 6/28/18)


     SULFAMETHOXAZOLE (Verified  Allergy, Severe, Anaphylaxis, 6/28/18)


     TRIMETHOPRIM (Verified  Allergy, Severe, Anaphylaxis, 6/28/18)


     LATEX, NATURAL RUBBER (Verified  Allergy, Unknown, 6/28/18)


Uncoded Allergies:  


     MUSHROOMS (Allergy, Unknown, 6/28/18)





Objective


Vital Signs





Last 24 Hour Vital Signs








  Date Time  Temp Pulse Resp B/P (MAP) Pulse Ox O2 Delivery O2 Flow Rate FiO2


 


6/28/18 09:49 98.7       


 


6/28/18 08:50 100.1       


 


6/28/18 08:30 100.1 111 20 115/67 100 Room Air  





 100.1       


 


6/28/18 05:53 98.0 88 20 128/87 94 Room Air  





 98.0       


 


6/28/18 05:45 99.0 104 20 109/65 97 Room Air  





 99.0       


 


6/28/18 05:17 98.0 88 20 128/87 94 Room Air  





 98.0       


 


6/28/18 03:38 102.0       


 


6/28/18 02:44 102.8 123 20 105/69 94 Room Air  





 102.7       








Height (Feet):  5


Height (Inches):  5.00


Weight (Pounds):  230





Laboratory Tests








Test


  6/28/18


03:50 6/28/18


04:00


 


Prothrombin Time


  9.6 SEC


(9.30-11.50) 


 


 


Prothromb Time International


Ratio 0.9 (0.9-1.1)  


  


 


 


Activated Partial


Thromboplast Time 34 SEC (23-33)


H 


 


 


Urine Color Red   


 


Urine Appearance Cloudy   


 


Urine pH 6.5 (4.5-8.0)   


 


Urine Specific Gravity


  1.010


(1.005-1.035) 


 


 


Urine Protein


  3+ (NEGATIVE)


H 


 


 


Urine Glucose (UA)


  4+ (NEGATIVE)


H 


 


 


Urine Ketones


  2+ (NEGATIVE)


H 


 


 


Urine Occult Blood


  5+ (NEGATIVE)


H 


 


 


Urine Nitrite


  Positive


(NEGATIVE)  H 


 


 


Urine Bilirubin


  Negative


(NEGATIVE) 


 


 


Urine Urobilinogen


  Normal MG/DL


(0.0-1.0) 


 


 


Urine Leukocyte Esterase


  3+ (NEGATIVE)


H 


 


 


Urine RBC


  Tntc /HPF (0 -


2)  H 


 


 


Urine WBC


  Tntc /HPF (0 -


2)  H 


 


 


Urine Squamous Epithelial


Cells Few /LPF


(NONE/OCC) 


 


 


Urine Bacteria


  Moderate /HPF


(NONE)  H 


 


 


Urine HCG, Qualitative


  Negative


(NEGATIVE) 


 


 


Sodium Level


  128 MMOL/L


(136-145)  L 


 


 


Potassium Level


  3.6 MMOL/L


(3.5-5.1) 


 


 


Chloride Level


  92 MMOL/L


()  L 


 


 


Carbon Dioxide Level


  24 MMOL/L


(21-32) 


 


 


Anion Gap


  12 mmol/L


(5-15) 


 


 


Blood Urea Nitrogen


  17 mg/dL


(7-18) 


 


 


Creatinine


  1.7 MG/DL


(0.55-1.30)  H 


 


 


Estimat Glomerular Filtration


Rate 41.9 mL/min


(>60) 


 


 


Glucose Level


  498 MG/DL


()  H 


 


 


Lactic Acid Level


  1.60 mmol/L


(0.4-2.0) 


 


 


Calcium Level


  9.8 MG/DL


(8.5-10.1) 


 


 


Total Bilirubin


  0.5 MG/DL


(0.2-1.0) 


 


 


Aspartate Amino Transf


(AST/SGOT) 16 U/L (15-37)


  


 


 


Alanine Aminotransferase


(ALT/SGPT) 24 U/L (12-78)


  


 


 


Alkaline Phosphatase


  113 U/L


() 


 


 


Total Creatine Kinase


  42 U/L


() 


 


 


Troponin I


  0.000 ng/mL


(0.000-0.056) 


 


 


Total Protein


  8.9 G/DL


(6.4-8.2)  H 


 


 


Albumin


  2.6 G/DL


(3.4-5.0)  L 


 


 


Globulin 6.3 g/dL   


 


Albumin/Globulin Ratio


  0.4 (1.0-2.7)


L 


 


 


White Blood Count


  


  17.3 K/UL


(4.8-10.8)  H


 


Red Blood Count


  


  4.03 M/UL


(4.20-5.40)  L


 


Hemoglobin


  


  12.2 G/DL


(12.0-16.0)


 


Hematocrit


  


  37.7 %


(37.0-47.0)


 


Mean Corpuscular Volume  94 FL (80-99)  


 


Mean Corpuscular Hemoglobin


  


  30.3 PG


(27.0-31.0)


 


Mean Corpuscular Hemoglobin


Concent 


  32.3 G/DL


(32.0-36.0)


 


Red Cell Distribution Width


  


  11.7 %


(11.6-14.8)


 


Platelet Count


  


  274 K/UL


(150-450)


 


Mean Platelet Volume


  


  7.3 FL


(6.5-10.1)


 


Neutrophils (%) (Auto)


  


  82.1 %


(45.0-75.0)  H


 


Lymphocytes (%) (Auto)


  


  9.8 %


(20.0-45.0)  L


 


Monocytes (%) (Auto)


  


  7.6 %


(1.0-10.0)


 


Eosinophils (%) (Auto)


  


  0.1 %


(0.0-3.0)


 


Basophils (%) (Auto)


  


  0.5 %


(0.0-2.0)











Current Medications








 Medications


  (Trade)  Dose


 Ordered  Sig/Oswaldo


 Route


 PRN Reason  Start Time


 Stop Time Status Last Admin


Dose Admin


 


 Acetaminophen


  (Tylenol)  650 mg  Q4H  PRN


 ORAL


 Mild Pain/Temp > 100.5  6/28/18 08:15


 7/28/18 08:14  6/28/18 08:50


 


 


 Cefepime HCl 2 gm/


 Dextrose  110 ml @ 


 220 mls/hr  Q24H


 IVPB


   6/29/18 05:00


 7/6/18 04:59   


 


 


 Dextrose


  (Dextrose 50%)  25 ml  STAT  PRN


 IV


 Hypoglycemia  6/28/18 08:15


 7/28/18 08:14   


 


 


 Dextrose


  (Dextrose 50%)  50 ml  STAT  PRN


 IV


 Hypoglycemia  6/28/18 08:15


 7/28/18 08:14   


 


 


 Dextrose/Sodium


 Chloride  1,000 ml @ 


 125 mls/hr  Q8H


 IV


   6/28/18 09:00


 7/28/18 08:59  6/28/18 08:51


 


 


 Heparin Sodium


  (Porcine)


  (Heparin 5000


 units/ml)  5,000 units  EVERY 12  HOURS


 SUBQ


   6/28/18 09:00


 7/28/18 08:59   


 


 


 Insulin Aspart


  (NovoLOG)    BEFORE MEALS AND  HS


 SUBQ


   6/28/18 11:30


 7/28/18 11:29  6/28/18 11:30


 


 


 Morphine Sulfate


  (Morphine


 Sulfate)  2 mg  Q8H  PRN


 IVP


 pain 4-10  6/28/18 08:15


 7/5/18 08:14  6/28/18 08:50


 


 


 Ondansetron HCl


  (Zofran)  4 mg  Q6H  PRN


 IVP


 Nausea & Vomiting  6/28/18 08:45


 7/28/18 08:44   


 


 


 Pantoprazole


  (Protonix)  40 mg  DAILY


 IVP


   6/28/18 09:00


 7/28/18 08:59  6/28/18 08:47


 


 


 Vancomycin HCl


  (Vanco rx to


 dose)  1 ea  DAILY  PRN


 MISC


 Per rx protocol  6/28/18 08:15


 7/28/18 08:14   


 


 


 Vancomycin HCl/


 Dextrose  250 ml @ 


 125 mls/hr  Q24H


 IVPB


   6/28/18 10:00


 7/3/18 09:59  6/28/18 11:21


 

















Anna Lopez M.D. Jun 28, 2018 12:11

## 2018-06-29 NOTE — GENERAL PROGRESS NOTE
Assessment/Plan


Assessment/Plan


S: I have still pain





O: appears more comfortable . Positive for general weakness





PHYSICAL EXAMINATION:


VITAL SIGNS:  Blood pressure 120/80, temperature 98.2, pulse rate 88,


respiratory rate 18, and pulse ox 94% on room air.


HEAD AND NECK:  Atraumatic and normocephalic.


CHEST:  Clear to auscultation.


HEART:  S1 and S2.  Regular rate and rhythm.


ABDOMEN:  Positive for tenderness on deep palpation.  Negative for any


rebound tenderness.


MUSCULOSKELETAL:  No gross focal motor deficit.


NEUROLOGIC:  The patient is awake, alert, and oriented x3.





Meds: reviewed. including Levofloxacin IV daily





ASSESSMENT:


1. Acute pyelonephritis.


2. Prediabetes.


3. GI and DVT prophylaxis.





PLAN OF CARE: 


current management


Febrile





Subjective


Allergies:  


Coded Allergies:  


     PENICILLINS (Verified  Allergy, Severe, Anaphylaxis, 6/28/18)


     PINEAPPLE (Verified  Allergy, Severe, 6/28/18)


     SULFAMETHOXAZOLE (Verified  Allergy, Severe, Anaphylaxis, 6/28/18)


     TRIMETHOPRIM (Verified  Allergy, Severe, Anaphylaxis, 6/28/18)


     LATEX, NATURAL RUBBER (Verified  Allergy, Unknown, 6/28/18)


Uncoded Allergies:  


     MUSHROOMS (Allergy, Unknown, 6/28/18)





Objective





Last 24 Hour Vital Signs








  Date Time  Temp Pulse Resp B/P (MAP) Pulse Ox O2 Delivery O2 Flow Rate FiO2


 


6/29/18 08:00 99.7 108 20 105/59 97 Room Air  





 99.7       


 


6/29/18 06:28 98.2 109    Room Air  





 98.2       


 


6/29/18 06:27 98.2       


 


6/29/18 05:24 101.8       


 


6/29/18 04:19 101.8 112 20 116/59 96 Room Air  





 101.8       


 


6/29/18 03:52 99.0       


 


6/29/18 03:22 99.0       


 


6/29/18 00:46      Room Air  


 


6/29/18 00:36 99.0 106 18 115/73 98 Room Air  





 99.0       


 


6/29/18 00:25 98.8       


 


6/28/18 21:29 98.8       





 98.8       


 


6/28/18 20:20  110    Room Air  


 


6/28/18 20:08 100.4 118 20 102/60 95 Room Air  





 100.4       


 


6/28/18 18:35 101.8       


 


6/28/18 16:00 99.5 105 20 118/71 98 Room Air  





 99.5       


 


6/28/18 12:00 97.6 99 18 107/59 98 Room Air  





 97.6       

















Intake and Output  


 


 6/28/18 6/29/18





 19:00 07:00


 


Intake Total  1375 ml


 


Balance  1375 ml


 


  


 


IV Total  1375 ml


 


# Voids 4 2








Laboratory Tests


6/29/18 06:50: 


White Blood Count 14.9H, Red Blood Count 3.22L, Hemoglobin 9.9L, Hematocrit 

29.9L, Mean Corpuscular Volume 93, Mean Corpuscular Hemoglobin 30.8, Mean 

Corpuscular Hemoglobin Concent 33.1, Red Cell Distribution Width 11.8, Platelet 

Count 294, Mean Platelet Volume 7.1, Neutrophils (%) (Auto) 78.8H, Lymphocytes (

%) (Auto) 11.2L, Monocytes (%) (Auto) 9.3, Eosinophils (%) (Auto) 0.3, 

Basophils (%) (Auto) 0.4, Sodium Level 135L, Potassium Level 3.1L, Chloride 

Level 102, Carbon Dioxide Level 23, Anion Gap 10, Blood Urea Nitrogen 8, 

Creatinine 1.2, Estimat Glomerular Filtration Rate > 60, Glucose Level 311#H, 

Hemoglobin A1c 10.4H, Calcium Level 8.6, Total Bilirubin 0.3, Aspartate Amino 

Transf (AST/SGOT) 17, Alanine Aminotransferase (ALT/SGPT) 19, Alkaline 

Phosphatase 87, Total Protein 7.1, Albumin 1.8L, Globulin 5.3, Albumin/Globulin 

Ratio 0.3L


Height (Feet):  5


Height (Inches):  5.00


Weight (Pounds):  230











Ave Arceo MD Jun 29, 2018 10:48

## 2018-06-29 NOTE — INFECTIOUS DISEASES PROG NOTE
Assessment/Plan


Problems:  


(1) Pyelonephritis


Assessment & Plan:  with persistent fever and leukocytosis , concern for 

phlegmon VS abscess , will upgrade her antibiotics to meropenem and continue 

vancomycin , may need urology eval. urine culture grew gram negative bacillus 

and gram positive cocci small colonies.  





(2) Sepsis


Assessment & Plan:  with leukocytosis due to the above , continue vancomycin 

and meropenem  pending blood culture. 





(3) MARGOTH (acute kidney injury)


Assessment & Plan:  due to the above, continue IVF for hydration, monitor renal 

function 





(4) Diabetes mellitus


Assessment & Plan:  poorly controled , recommend tight glycemic control to keep 

blood glucose between 100-140 








Subjective


Constitutional:  Reports: fever, fatigue


HEENT:  Reports: no symptoms


Respiratory:  Reports: no symptoms


Breasts:  Reports: no symptoms


Cardiovascular:  Reports: no symptoms


Gastrointestinal/Abdominal:  Reports: nausea, bloating, other - left flank pain


Genitourinary:  Reports: dysuria, frequency


Neurologic:  Reports: no symptoms


Psychiatric:  Reports: no symptoms


Skin:  Reports: no symptoms


Endocrine:  Reports: no symptoms


Hematologic:  Reports: no symptoms


Musculoskeletal:  Reports: no symptoms


Allergies:  


Coded Allergies:  


     PENICILLINS (Verified  Allergy, Severe, Anaphylaxis, 6/28/18)


     PINEAPPLE (Verified  Allergy, Severe, 6/28/18)


     SULFAMETHOXAZOLE (Verified  Allergy, Severe, Anaphylaxis, 6/28/18)


     TRIMETHOPRIM (Verified  Allergy, Severe, Anaphylaxis, 6/28/18)


     LATEX, NATURAL RUBBER (Verified  Allergy, Unknown, 6/28/18)


Uncoded Allergies:  


     MUSHROOMS (Allergy, Unknown, 6/28/18)





Objective


Vital Signs





Last 24 Hour Vital Signs








  Date Time  Temp Pulse Resp B/P (MAP) Pulse Ox O2 Delivery O2 Flow Rate FiO2


 


6/29/18 12:29 100.1       


 


6/29/18 12:00 101.2 106 20 117/68 97 Room Air  





 101.2       


 


6/29/18 11:30 101.8       


 


6/29/18 08:00 99.7 108 20 105/59 97 Room Air  





 99.7       


 


6/29/18 06:28 98.2 109    Room Air  





 98.2       


 


6/29/18 05:24 101.8       


 


6/29/18 04:19 101.8 112 20 116/59 96 Room Air  





 101.8       


 


6/29/18 03:52 99.0       


 


6/29/18 03:22 99.0       


 


6/29/18 00:46      Room Air  


 


6/29/18 00:36 99.0 106 18 115/73 98 Room Air  





 99.0       


 


6/29/18 00:25 98.8       


 


6/28/18 21:29 98.8       





 98.8       


 


6/28/18 20:20  110    Room Air  


 


6/28/18 20:08 100.4 118 20 102/60 95 Room Air  





 100.4       


 


6/28/18 18:35 101.8       


 


6/28/18 16:00 99.5 105 20 118/71 98 Room Air  





 99.5       








Height (Feet):  5


Height (Inches):  5.00


Weight (Pounds):  230


General Appearance:  WD/WN, no acute distress


HEENT:  normocephalic, atraumatic, anicteric, mucous membranes moist, PERRL


Respiratory/Chest:  chest wall non-tender, lungs clear, normal breath sounds, 

no respiratory distress, no accessory muscle use


Cardiovascular:  normal peripheral pulses, normal rate, regular rhythm, no 

gallop/murmur, no JVD


Abdomen:  normal bowel sounds, no organomegaly, no mass, no scars, distended, 

tender


Extremities:  no cyanosis, no clubbing


Skin:  no rash, no lesions, no ulcers


Neurologic/Psychiatric:  alert, oriented x 3, responsive


Lymphatic:  no neck adenopathy, no groin adenopathy





Microbiology








 Date/Time


Source Procedure


Growth Status


 


 


 6/28/18 03:34


Blood Blood Culture - Preliminary


NO GROWTH AFTER 24 HOURS Resulted


 


 6/28/18 03:19


Blood Blood Culture - Preliminary


NO GROWTH AFTER 24 HOURS Resulted


 


 6/28/18 03:50


Urine,Clean Catch Urine Culture - Preliminary


Gram Negative Bacillus 1


Gram Positive Cocci Resulted











Laboratory Tests








Test


  6/29/18


06:50


 


White Blood Count


  14.9 K/UL


(4.8-10.8)  H


 


Red Blood Count


  3.22 M/UL


(4.20-5.40)  L


 


Hemoglobin


  9.9 G/DL


(12.0-16.0)  L


 


Hematocrit


  29.9 %


(37.0-47.0)  L


 


Mean Corpuscular Volume 93 FL (80-99)  


 


Mean Corpuscular Hemoglobin


  30.8 PG


(27.0-31.0)


 


Mean Corpuscular Hemoglobin


Concent 33.1 G/DL


(32.0-36.0)


 


Red Cell Distribution Width


  11.8 %


(11.6-14.8)


 


Platelet Count


  294 K/UL


(150-450)


 


Mean Platelet Volume


  7.1 FL


(6.5-10.1)


 


Neutrophils (%) (Auto)


  78.8 %


(45.0-75.0)  H


 


Lymphocytes (%) (Auto)


  11.2 %


(20.0-45.0)  L


 


Monocytes (%) (Auto)


  9.3 %


(1.0-10.0)


 


Eosinophils (%) (Auto)


  0.3 %


(0.0-3.0)


 


Basophils (%) (Auto)


  0.4 %


(0.0-2.0)


 


Sodium Level


  135 MMOL/L


(136-145)  L


 


Potassium Level


  3.1 MMOL/L


(3.5-5.1)  L


 


Chloride Level


  102 MMOL/L


()


 


Carbon Dioxide Level


  23 MMOL/L


(21-32)


 


Anion Gap


  10 mmol/L


(5-15)


 


Blood Urea Nitrogen


  8 mg/dL (7-18)


 


 


Creatinine


  1.2 MG/DL


(0.55-1.30)


 


Estimat Glomerular Filtration


Rate > 60 mL/min


(>60)


 


Glucose Level


  311 MG/DL


()  #H


 


Hemoglobin A1c


  10.4 %


(4.3-6.0)  H


 


Calcium Level


  8.6 MG/DL


(8.5-10.1)


 


Total Bilirubin


  0.3 MG/DL


(0.2-1.0)


 


Aspartate Amino Transf


(AST/SGOT) 17 U/L (15-37)


 


 


Alanine Aminotransferase


(ALT/SGPT) 19 U/L (12-78)


 


 


Alkaline Phosphatase


  87 U/L


()


 


Total Protein


  7.1 G/DL


(6.4-8.2)


 


Albumin


  1.8 G/DL


(3.4-5.0)  L


 


Globulin 5.3 g/dL  


 


Albumin/Globulin Ratio


  0.3 (1.0-2.7)


L











Current Medications








 Medications


  (Trade)  Dose


 Ordered  Sig/Oswaldo


 Route


 PRN Reason  Start Time


 Stop Time Status Last Admin


Dose Admin


 


 Acetaminophen


  (Tylenol)  650 mg  Q4H  PRN


 ORAL


 Mild Pain/Temp > 100.5  6/28/18 08:15


 7/28/18 08:14  6/29/18 11:30


 


 


 Dextrose


  (Dextrose 50%)  25 ml  STAT  PRN


 IV


 Hypoglycemia  6/28/18 08:15


 7/28/18 08:14   


 


 


 Dextrose


  (Dextrose 50%)  50 ml  STAT  PRN


 IV


 Hypoglycemia  6/28/18 08:15


 7/28/18 08:14   


 


 


 Dextrose/Sodium


 Chloride  1,000 ml @ 


 125 mls/hr  Q8H


 IV


   6/28/18 09:00


 7/28/18 08:59  6/29/18 08:34


 


 


 Enoxaparin Sodium


  (Lovenox)  30 mg  EVERY 12  HOURS


 SUBQ


   6/29/18 10:30


 7/29/18 10:29  6/29/18 10:21


 


 


 Glimepiride


  (Amaryl)  2 mg  ACBREAKFAST


 ORAL


   6/30/18 06:30


 7/30/18 06:29   


 


 


 Insulin Aspart


  (NovoLOG)    BEFORE MEALS AND  HS


 SUBQ


   6/28/18 11:30


 7/28/18 11:29  6/29/18 12:00


 


 


 Meropenem 1 gm/


 Sodium Chloride  55 ml @ 


 110 mls/hr  Q8HR


 IVPB


   6/29/18 16:00


 7/4/18 15:59   


 


 


 Morphine Sulfate


  (Morphine


 Sulfate)  2 mg  Q8H  PRN


 IVP


 pain 4-10  6/28/18 08:15


 7/5/18 08:14  6/29/18 03:22


 


 


 Ondansetron HCl


  (Zofran)  4 mg  Q6H  PRN


 IVP


 Nausea & Vomiting  6/28/18 08:45


 7/28/18 08:44   


 


 


 Pantoprazole


  (Protonix)  40 mg  DAILY


 IVP


   6/28/18 09:00


 7/28/18 08:59  6/29/18 08:32


 


 


 Sitagliptin


 Phosphate


  (Januvia)  25 mg  ACBREAKFAST


 ORAL


   6/30/18 06:30


 7/30/18 06:29   


 


 


 Vancomycin HCl


  (Vanco rx to


 dose)  1 ea  DAILY  PRN


 MISC


 Per rx protocol  6/28/18 08:15


 7/28/18 08:14   


 


 


 Vancomycin HCl/


 Dextrose  250 ml @ 


 125 mls/hr  Q24H


 IVPB


   6/28/18 10:00


 7/3/18 09:59  6/29/18 10:20


 

















Anna Lopez M.D. Jun 29, 2018 15:35

## 2018-06-30 NOTE — GENERAL PROGRESS NOTE
Assessment/Plan


Assessment/Plan


S: I have pain in my wrist





O: appears more comfortable . toelrating diet. persistent fever, Positive for 

general weakness





PHYSICAL EXAMINATION:


HEAD AND NECK:  Atraumatic and normocephalic.


CHEST:  Clear to auscultation.


HEART:  S1 and S2.  Regular rate and rhythm.


ABDOMEN:  Positive for tenderness on deep palpation.  Negative for any


rebound tenderness.


MUSCULOSKELETAL:  No gross focal motor deficit.


NEUROLOGIC:  The patient is awake, alert, and oriented x3.





Meds: reviewed. including Vanco and Meropenem





ASSESSMENT:


1. Acute pyelonephritis.


2. DM-2 : Uncontrolled 


3. GI and DVT prophylaxis.





PLAN OF CARE: 


Persistent low grade fever


Pending cultures





Subjective


Allergies:  


Coded Allergies:  


     PENICILLINS (Verified  Allergy, Severe, Anaphylaxis, 6/28/18)


     PINEAPPLE (Verified  Allergy, Severe, 6/28/18)


     SULFAMETHOXAZOLE (Verified  Allergy, Severe, Anaphylaxis, 6/28/18)


     TRIMETHOPRIM (Verified  Allergy, Severe, Anaphylaxis, 6/28/18)


     LATEX, NATURAL RUBBER (Verified  Allergy, Unknown, 6/28/18)


Uncoded Allergies:  


     MUSHROOMS (Allergy, Unknown, 6/28/18)





Objective





Last 24 Hour Vital Signs








  Date Time  Temp Pulse Resp B/P (MAP) Pulse Ox O2 Delivery O2 Flow Rate FiO2


 


6/30/18 12:00 100.3 112 20 142/83 98 Room Air  





 100.3       


 


6/30/18 08:00 100.5 108 20 113/59 96 Room Air  





 100.5       


 


6/30/18 05:58 98.1       


 


6/30/18 04:59 101.1       


 


6/30/18 04:00 101.1 100 19 107/56 96 Room Air  





 101.1       


 


6/30/18 00:38 100.8       


 


6/30/18 00:11 100.8 103 20 112/56 98 Room Air  





 100.8       


 


6/29/18 20:00 101.4 112 20 109/69 100 Room Air  





 101.4       


 


6/29/18 19:59 101.4       


 


6/29/18 17:40 98.4       





 98.4       


 


6/29/18 16:00 100.2 82 22 99/67 98 Room Air  





 100.2       

















Intake and Output  


 


 6/29/18 6/30/18





 19:00 07:00


 


Intake Total 2080 ml 485 ml


 


Balance 2080 ml 485 ml


 


  


 


Intake Oral 720 ml 


 


IV Total 1360 ml 485 ml


 


# Voids 3 3


 


# Bowel Movements  1








Height (Feet):  5


Height (Inches):  5.00


Weight (Pounds):  230











Ave Arceo MD Jun 30, 2018 12:42

## 2018-06-30 NOTE — INFECTIOUS DISEASES PROG NOTE
Assessment/Plan


Problems:  


(1) Pyelonephritis


Assessment & Plan:  with persistent fever and leukocytosis , concern for 

phlegmon VS abscess , continue meropenem to cover both E.coli and strep 

agalactiae , stop vancomycin, recommend  urology eval. 





(2) Sepsis


Assessment & Plan:  with leukocytosis due to the above , continue meropenem , 

will order renal US . recommend urology consult .





(3) MARGOTH (acute kidney injury)


Assessment & Plan:  due to the above, continue IVF for hydration, monitor renal 

function 





(4) Diabetes mellitus


Assessment & Plan:  poorly controled , recommend tight glycemic control to keep 

blood glucose between 100-140 








Subjective


Constitutional:  Reports: fever


HEENT:  Reports: no symptoms


Respiratory:  Reports: no symptoms


Breasts:  Reports: no symptoms


Cardiovascular:  Reports: no symptoms


Gastrointestinal/Abdominal:  Reports: bloating


Genitourinary:  Reports: no symptoms


Neurologic:  Reports: no symptoms


Psychiatric:  Reports: no symptoms


Skin:  Reports: no symptoms


Endocrine:  Reports: no symptoms


Hematologic:  Reports: no symptoms


Musculoskeletal:  Reports: no symptoms


Allergies:  


Coded Allergies:  


     PENICILLINS (Verified  Allergy, Severe, Anaphylaxis, 6/28/18)


     PINEAPPLE (Verified  Allergy, Severe, 6/28/18)


     SULFAMETHOXAZOLE (Verified  Allergy, Severe, Anaphylaxis, 6/28/18)


     TRIMETHOPRIM (Verified  Allergy, Severe, Anaphylaxis, 6/28/18)


     LATEX, NATURAL RUBBER (Verified  Allergy, Unknown, 6/28/18)


Uncoded Allergies:  


     MUSHROOMS (Allergy, Unknown, 6/28/18)





Objective


Vital Signs





Last 24 Hour Vital Signs








  Date Time  Temp Pulse Resp B/P (MAP) Pulse Ox O2 Delivery O2 Flow Rate FiO2


 


6/30/18 18:38 100.0 105 19 112/74 99 Room Air  





 100.0       


 


6/30/18 16:00 100.4 106 19 110/70 99 Room Air  





 100.4       


 


6/30/18 13:41 100.9       


 


6/30/18 12:42 100.5       


 


6/30/18 12:00 100.3 112 20 142/83 98 Room Air  





 100.3       


 


6/30/18 08:00 100.5 108 20 113/59 96 Room Air  





 100.5       


 


6/30/18 04:59 101.1       


 


6/30/18 04:00 101.1 100 19 107/56 96 Room Air  





 101.1       


 


6/30/18 00:38 100.8       


 


6/30/18 00:11 100.8 103 20 112/56 98 Room Air  





 100.8       








Height (Feet):  5


Height (Inches):  5.00


Weight (Pounds):  230





Microbiology








 Date/Time


Source Procedure


Growth Status


 


 


 6/28/18 03:34


Blood Blood Culture - Preliminary


NO GROWTH AFTER 24 HOURS Resulted


 


 6/28/18 03:19


Blood Blood Culture - Preliminary


NO GROWTH AFTER 24 HOURS Resulted


 


 6/28/18 03:50


Urine,Clean Catch Urine Culture - Final


Escherichia Coli


Strep Agalactiae Group B Complete











Current Medications








 Medications


  (Trade)  Dose


 Ordered  Sig/Oswaldo


 Route


 PRN Reason  Start Time


 Stop Time Status Last Admin


Dose Admin


 


 Acetaminophen


  (Tylenol)  650 mg  Q4H  PRN


 ORAL


 Mild Pain/Temp > 100.5  6/28/18 08:15


 7/28/18 08:14  6/30/18 12:42


 


 


 Dextrose


  (Dextrose 50%)  25 ml  STAT  PRN


 IV


 Hypoglycemia  6/28/18 08:15


 7/28/18 08:14   


 


 


 Dextrose


  (Dextrose 50%)  50 ml  STAT  PRN


 IV


 Hypoglycemia  6/28/18 08:15


 7/28/18 08:14   


 


 


 Dextrose/Sodium


 Chloride  1,000 ml @ 


 125 mls/hr  Q8H


 IV


   6/28/18 09:00


 7/28/18 08:59  6/30/18 12:41


 


 


 Enoxaparin Sodium


  (Lovenox)  30 mg  EVERY 12  HOURS


 SUBQ


   6/29/18 10:30


 7/29/18 10:29  6/30/18 09:03


 


 


 Glimepiride


  (Amaryl)  2 mg  ACBREAKFAST


 ORAL


   6/30/18 06:30


 7/30/18 06:29  6/30/18 06:13


 


 


 Insulin Aspart


  (NovoLOG)    BEFORE MEALS AND  HS


 SUBQ


   6/28/18 11:30


 7/28/18 11:29  6/30/18 16:53


 


 


 Meropenem 1 gm/


 Sodium Chloride  55 ml @ 


 110 mls/hr  Q8HR


 IVPB


   6/29/18 16:00


 7/4/18 15:59  6/30/18 14:32


 


 


 Morphine Sulfate


  (Morphine


 Sulfate)  2 mg  Q8H  PRN


 IVP


 pain 4-10  6/28/18 08:15


 7/5/18 08:14  6/30/18 01:50


 


 


 Ondansetron HCl


  (Zofran)  4 mg  Q6H  PRN


 IVP


 Nausea & Vomiting  6/28/18 08:45


 7/28/18 08:44  6/29/18 17:44


 


 


 Pantoprazole


  (Protonix)  40 mg  DAILY


 IVP


   6/28/18 09:00


 7/28/18 08:59  6/30/18 09:01


 


 


 Sitagliptin


 Phosphate


  (Januvia)  25 mg  ACBREAKFAST


 ORAL


   6/30/18 06:30


 7/30/18 06:29  6/30/18 06:13


 

















Anna Lopez M.D. Jun 30, 2018 20:33

## 2018-06-30 NOTE — DIAGNOSTIC IMAGING REPORT
EXAM:

  US Retroperitoneal Limited, Renal

 

CLINICAL HISTORY:

  PAIN

 

TECHNIQUE:

  Real-time ultrasound of the retroperitoneum (limited) with image 

documentation.

 

COMPARISON:

  No relevant prior studies available.

 

FINDINGS:

  Right kidney:  Right kidney measures 14.4 x 5.4 x 6.3 cm. No 

hydronephrosis.

  Left kidney:  Left kidney measures 14 x 7.1 x 5.4 cm. No hydronephrosis.

  

  Bladder:  Under distended and thickened bladder.  Bilateral ureteral 

jets are seen.

 

IMPRESSION:     

1.  No hydronephrosis.

2.  Under distended and thickened bladder.

## 2018-07-02 NOTE — GENERAL PROGRESS NOTE
Assessment/Plan


Assessment/Plan


S: I have pain in my wrist





O: appears more comfortable . toelrating diet. persistent fever, Positive for 

general weakness





PHYSICAL EXAMINATION:


HEAD AND NECK:  Atraumatic and normocephalic.


CHEST:  Clear to auscultation.


HEART:  S1 and S2.  Regular rate and rhythm.


ABDOMEN:  Positive for tenderness on deep palpation.  Negative for any


rebound tenderness.


MUSCULOSKELETAL:  No gross focal motor deficit.


NEUROLOGIC:  The patient is awake, alert, and oriented x3.





Meds: reviewed. including Vanco and Meropenem





ASSESSMENT:


1. Acute pyelonephritis.


2. DM-2 : Uncontrolled 


3. GI and DVT prophylaxis.





PLAN OF CARE: 


Pending completion of workup by ID


Antibiotic per ID Rec.





Subjective


Allergies:  


Coded Allergies:  


     PENICILLINS (Verified  Allergy, Severe, Anaphylaxis, 6/28/18)


     PINEAPPLE (Verified  Allergy, Severe, 6/28/18)


     SULFAMETHOXAZOLE (Verified  Allergy, Severe, Anaphylaxis, 6/28/18)


     TRIMETHOPRIM (Verified  Allergy, Severe, Anaphylaxis, 6/28/18)


     LATEX, NATURAL RUBBER (Verified  Allergy, Unknown, 6/28/18)


Uncoded Allergies:  


     MUSHROOMS (Allergy, Unknown, 6/28/18)





Objective





Last 24 Hour Vital Signs








  Date Time  Temp Pulse Resp B/P (MAP) Pulse Ox O2 Delivery O2 Flow Rate FiO2


 


7/2/18 08:00 99.2 93 20 127/63 97   





 99.2       


 


7/2/18 04:00 99.0 99 18 111/66 97 Room Air  





 99.0       


 


7/2/18 00:06 98.2 89 18 113/64 98 Room Air  





 98.2       


 


7/1/18 22:19 98.2       


 


7/1/18 21:20 100.4       


 


7/1/18 20:00 100.4 101 20 135/69 99 Room Air  





 100.4       


 


7/1/18 16:00 98.6 104 20 128/74 98 Room Air  





 98.6       


 


7/1/18 12:00 99.3 97 20 135/77 98 Room Air  





 99.3       

















Intake and Output  


 


 7/1/18 7/2/18





 19:00 07:00


 


Intake Total 385 ml 2360 ml


 


Output Total  1200 ml


 


Balance 385 ml 1160 ml


 


  


 


Intake Oral 260 ml 1000 ml


 


IV Total 125 ml 1360 ml


 


Output Urine Total  1200 ml


 


# Voids 3 3








Laboratory Tests


7/2/18 05:40: 


White Blood Count 10.0, Red Blood Count 3.69L, Hemoglobin 11.4L, Hematocrit 

34.7L, Mean Corpuscular Volume 94, Mean Corpuscular Hemoglobin 30.9, Mean 

Corpuscular Hemoglobin Concent 32.8, Red Cell Distribution Width 12.4, Platelet 

Count 522H, Mean Platelet Volume 6.3L, Neutrophils (%) (Auto) 66.1, Lymphocytes 

(%) (Auto) 23.7, Monocytes (%) (Auto) 8.6, Eosinophils (%) (Auto) 0.9, 

Basophils (%) (Auto) 0.6, Sodium Level 138, Potassium Level 3.2L, Chloride 

Level 104, Carbon Dioxide Level 27, Anion Gap 7, Blood Urea Nitrogen 5L, 

Creatinine 1.0, Estimat Glomerular Filtration Rate > 60, Glucose Level 214H, 

Calcium Level 8.8, Total Bilirubin 0.2, Aspartate Amino Transf (AST/SGOT) 28, 

Alanine Aminotransferase (ALT/SGPT) 25, Alkaline Phosphatase 80, Total Protein 

7.9, Albumin 2.0L, Globulin 5.9, Albumin/Globulin Ratio 0.3L


Height (Feet):  5


Height (Inches):  5.00


Weight (Pounds):  230











Ave Arceo MD Jul 2, 2018 09:54

## 2018-07-02 NOTE — DIAGNOSTIC IMAGING REPORT
Indication: Headache

 

Technique: Contiguous 5 mm thick transaxial imaging of the head obtained in a Siemens

Sensation 64 slice CT scanner.  Soft tissue and bone windows generated.  Automatic

Exposure Control was utilized.

 

 

Total Dose length Product (DLP):  1580.87 mGycm

 

CT Dose Index Volume (CTDIvol):   70.38,14.35 mGy

 

Comparison: none

 

Findings: The size and configuration of the cortical sulci, basal cisterns, and

ventricles are within normal limits for age. There is no mass effect, midline shift,

or edema identified. There is no evidence of acute hemorrhage or abnormal intra-axial

or extra-axial fluid collections. The bones and soft tissues are unremarkable.

 

Impression: No mass effect, edema or acute bleed.

 

 

 

The CT scanner at [facility] is accredited by the [employer] College [employer]

Radiology and the scans are performed using dose optimization techniques as

appropriate to a performed exam including Automatic Exposure control.

## 2018-07-02 NOTE — INFECTIOUS DISEASES PROG NOTE
Assessment/Plan


Problems:  


(1) Pyelonephritis


Assessment & Plan:  with  fever and leukocytosis , concern for phlegmon VS 

abscess , continue meropenem to cover both E.coli and strep agalactiae , ultra 

sound of the kidneys didn't show any obstruction or abscess recommend  urology 

eval. 





(2) Sepsis


Assessment & Plan:  with leukocytosis due to the above , improving , continue 

meropenem ,  renal US not conclusive . recommend urology consult .





(3) MARGOTH (acute kidney injury)


Assessment & Plan:  due to the above, continue IVF for hydration, monitor renal 

function 





(4) Diabetes mellitus


Assessment & Plan:  poorly controled , recommend tight glycemic control to keep 

blood glucose between 100-140 





(5) Headache


Assessment & Plan:  with neck stiffness rule out ICH or musculoskeletal 

pathology, will order CT scan of the head and neck with no contrast, continue 

pain medicine  








Subjective


Constitutional:  Reports: fatigue, anorexia


HEENT:  Reports: congestion, other - headache


Respiratory:  Reports: dry cough


Cardiovascular:  Reports: chest pain


Gastrointestinal/Abdominal:  Reports: nausea, vomiting


Genitourinary:  Reports: dysuria


Neurologic:  Reports: headache, weakness


Psychiatric:  Reports: no symptoms


Skin:  Reports: no symptoms


Endocrine:  Reports: no symptoms


Hematologic:  Reports: no symptoms


Musculoskeletal:  Reports: no symptoms


Allergies:  


Coded Allergies:  


     PENICILLINS (Verified  Allergy, Severe, Anaphylaxis, 6/28/18)


     PINEAPPLE (Verified  Allergy, Severe, 6/28/18)


     SULFAMETHOXAZOLE (Verified  Allergy, Severe, Anaphylaxis, 6/28/18)


     TRIMETHOPRIM (Verified  Allergy, Severe, Anaphylaxis, 6/28/18)


     LATEX, NATURAL RUBBER (Verified  Allergy, Unknown, 6/28/18)


Uncoded Allergies:  


     MUSHROOMS (Allergy, Unknown, 6/28/18)





Objective


Vital Signs





Last 24 Hour Vital Signs








  Date Time  Temp Pulse Resp B/P (MAP) Pulse Ox O2 Delivery O2 Flow Rate FiO2


 


7/2/18 12:00 99.3 96 18 111/64 99 Room Air  





 99.3       


 


7/2/18 08:00 99.2 93 20 127/63 97   





 99.2       


 


7/2/18 04:00 99.0 99 18 111/66 97 Room Air  





 99.0       


 


7/2/18 00:06 98.2 89 18 113/64 98 Room Air  





 98.2       


 


7/1/18 22:19 98.2       


 


7/1/18 21:20 100.4       


 


7/1/18 20:00 100.4 101 20 135/69 99 Room Air  





 100.4       


 


7/1/18 16:00 98.6 104 20 128/74 98 Room Air  





 98.6       








Height (Feet):  5


Height (Inches):  5.00


Weight (Pounds):  230


General Appearance:  WD/WN, no acute distress


HEENT:  normocephalic, atraumatic, anicteric, mucous membranes moist, PERRL


Respiratory/Chest:  chest wall non-tender, lungs clear, normal breath sounds, 

no respiratory distress, no accessory muscle use, decreased breath sounds


Cardiovascular:  normal peripheral pulses, normal rate, regular rhythm, no 

gallop/murmur, no JVD


Abdomen:  soft, non tender, no organomegaly, non distended, no mass, no scars, 

hypoactive bowel sounds


Extremities:  no cyanosis, no clubbing


Skin:  no rash, no lesions, no ulcers


Neurologic/Psychiatric:  CNs II-XII grossly normal, alert, oriented x 3, 

responsive


Lymphatic:  no neck adenopathy, no groin adenopathy


Musculoskeletal:  normal muscle bulk, no effusion





Microbiology








 Date/Time


Source Procedure


Growth Status


 


 


 6/30/18 11:50


Blood Blood Culture - Preliminary


NO GROWTH AFTER 24 HOURS Resulted


 


 6/30/18 11:30


Urine,Ureter/Kidney Urine Culture - Preliminary Resulted











Laboratory Tests








Test


  7/2/18


05:40


 


White Blood Count


  10.0 K/UL


(4.8-10.8)


 


Red Blood Count


  3.69 M/UL


(4.20-5.40)  L


 


Hemoglobin


  11.4 G/DL


(12.0-16.0)  L


 


Hematocrit


  34.7 %


(37.0-47.0)  L


 


Mean Corpuscular Volume 94 FL (80-99)  


 


Mean Corpuscular Hemoglobin


  30.9 PG


(27.0-31.0)


 


Mean Corpuscular Hemoglobin


Concent 32.8 G/DL


(32.0-36.0)


 


Red Cell Distribution Width


  12.4 %


(11.6-14.8)


 


Platelet Count


  522 K/UL


(150-450)  H


 


Mean Platelet Volume


  6.3 FL


(6.5-10.1)  L


 


Neutrophils (%) (Auto)


  66.1 %


(45.0-75.0)


 


Lymphocytes (%) (Auto)


  23.7 %


(20.0-45.0)


 


Monocytes (%) (Auto)


  8.6 %


(1.0-10.0)


 


Eosinophils (%) (Auto)


  0.9 %


(0.0-3.0)


 


Basophils (%) (Auto)


  0.6 %


(0.0-2.0)


 


Sodium Level


  138 MMOL/L


(136-145)


 


Potassium Level


  3.2 MMOL/L


(3.5-5.1)  L


 


Chloride Level


  104 MMOL/L


()


 


Carbon Dioxide Level


  27 MMOL/L


(21-32)


 


Anion Gap


  7 mmol/L


(5-15)


 


Blood Urea Nitrogen


  5 mg/dL (7-18)


L


 


Creatinine


  1.0 MG/DL


(0.55-1.30)


 


Estimat Glomerular Filtration


Rate > 60 mL/min


(>60)


 


Glucose Level


  214 MG/DL


()  H


 


Calcium Level


  8.8 MG/DL


(8.5-10.1)


 


Total Bilirubin


  0.2 MG/DL


(0.2-1.0)


 


Aspartate Amino Transf


(AST/SGOT) 28 U/L (15-37)


 


 


Alanine Aminotransferase


(ALT/SGPT) 25 U/L (12-78)


 


 


Alkaline Phosphatase


  80 U/L


()


 


Total Protein


  7.9 G/DL


(6.4-8.2)


 


Albumin


  2.0 G/DL


(3.4-5.0)  L


 


Globulin 5.9 g/dL  


 


Albumin/Globulin Ratio


  0.3 (1.0-2.7)


L











Current Medications








 Medications


  (Trade)  Dose


 Ordered  Sig/Oswaldo


 Route


 PRN Reason  Start Time


 Stop Time Status Last Admin


Dose Admin


 


 Acetaminophen


  (Tylenol)  650 mg  Q4H  PRN


 ORAL


 Mild Pain/Temp > 100.5  6/28/18 08:15


 7/28/18 08:14  7/1/18 21:20


 


 


 Dextrose


  (Dextrose 50%)  25 ml  STAT  PRN


 IV


 Hypoglycemia  6/28/18 08:15


 7/28/18 08:14   


 


 


 Dextrose


  (Dextrose 50%)  50 ml  STAT  PRN


 IV


 Hypoglycemia  6/28/18 08:15


 7/28/18 08:14   


 


 


 Dextrose/Sodium


 Chloride  1,000 ml @ 


 125 mls/hr  Q8H


 IV


   6/28/18 09:00


 7/28/18 08:59  7/2/18 09:16


 


 


 Enoxaparin Sodium


  (Lovenox)  30 mg  EVERY 12  HOURS


 SUBQ


   6/29/18 10:30


 7/29/18 10:29  7/2/18 09:16


 


 


 Glimepiride


  (Amaryl)  2 mg  ACBREAKFAST


 ORAL


   6/30/18 06:30


 7/30/18 06:29  7/2/18 06:21


 


 


 Insulin Aspart


  (NovoLOG)    BEFORE MEALS AND  HS


 SUBQ


   6/28/18 11:30


 7/28/18 11:29  7/2/18 12:16


 


 


 Meropenem 1 gm/


 Sodium Chloride  55 ml @ 


 110 mls/hr  Q8HR


 IVPB


   6/29/18 16:00


 7/4/18 15:59  7/2/18 05:30


 


 


 Morphine Sulfate


  (Morphine


 Sulfate)  2 mg  Q8H  PRN


 IVP


 pain 4-10  6/28/18 08:15


 7/5/18 08:14  7/1/18 21:43


 


 


 Ondansetron HCl


  (Zofran)  4 mg  Q6H  PRN


 IVP


 Nausea & Vomiting  6/28/18 08:45


 7/28/18 08:44  7/1/18 17:41


 


 


 Pantoprazole


  (Protonix)  40 mg  ACBREAKFAST


 ORAL


   7/2/18 06:30


 8/1/18 06:29  7/2/18 06:21


 


 


 Sitagliptin


 Phosphate


  (Januvia)  25 mg  ACBREAKFAST


 ORAL


   6/30/18 06:30


 7/30/18 06:29  7/2/18 06:21


 

















Anna Lopez M.D. Jul 2, 2018 14:04

## 2018-07-02 NOTE — DIAGNOSTIC IMAGING REPORT
Indication: Neck pain.

 

 

Technique: Continuous helical imaging of the cervical spine was obtained transaxially

from the skull base to the upper thoracic spine. 2-D coronal and sagittal reformatted

images were obtained. Automatic Exposure Control was utilized.

 

 

Total Dose length Product (DLP):  1580.87 mGycm

 

CT Dose Index Volume (CTDIvol):   70.38,14.35 mGy

 

Comparison: None

 

Findings: There is no evidence of an acute fracture or malalignment. Atlantoaxial

alignment appears normal. Height and configuration of the vertebral bodies and

intervertebral discs are within normal limits. Uncovertebral joints and facets are

unremarkable. There is no soft tissue swelling.

 

 

Impression: Negative cervical spine CT

 

 

 

The CT scanner at [facility] is accredited by the [employer]

[employer] and the scans are performed using dose optimization techniques as

appropriate to a performed exam including Automatic Exposure control.

## 2018-07-03 NOTE — INFECTIOUS DISEASES PROG NOTE
Assessment/Plan


Problems:  


(1) Pyelonephritis


Assessment & Plan:  with  fever and leukocytosis , improved on  meropenem , may 

switch to oral keflex to cover both E.coli and strep agalactiae for another 10 

days , ultra sound of the kidneys didn't show any obstruction or abscess . 

recommend to follow up with urology as an outpatient 





(2) Sepsis


Assessment & Plan:  with leukocytosis due to the above , improved with negative 

blood culture ,  renal US not conclusive . 





(3) MARGOTH (acute kidney injury)


Assessment & Plan:  improved, due to the above, encourage  hydration, monitor 

renal function 





(4) Diabetes mellitus


Assessment & Plan:  poorly controled , recommend tight glycemic control to keep 

blood glucose between 100-140 





(5) Headache


Assessment & Plan:  with neck stiffness, and no evidence of ICH or 

musculoskeletal pathology on CT scan of the head and neck , continue pain 

medicine as needed   








Subjective


Constitutional:  Reports: no symptoms


HEENT:  Reports: no symptoms


Respiratory:  Reports: no symptoms


Breasts:  Reports: no symptoms


Cardiovascular:  Reports: no symptoms


Gastrointestinal/Abdominal:  Reports: no symptoms


Genitourinary:  Reports: no symptoms


Neurologic:  Reports: no symptoms


Psychiatric:  Reports: no symptoms


Skin:  Reports: no symptoms


Endocrine:  Reports: no symptoms


Hematologic:  Reports: no symptoms


Musculoskeletal:  Reports: no symptoms


Allergies:  


Coded Allergies:  


     PENICILLINS (Verified  Allergy, Severe, Anaphylaxis, 6/28/18)


     PINEAPPLE (Verified  Allergy, Severe, 6/28/18)


     SULFAMETHOXAZOLE (Verified  Allergy, Severe, Anaphylaxis, 6/28/18)


     TRIMETHOPRIM (Verified  Allergy, Severe, Anaphylaxis, 6/28/18)


     LATEX, NATURAL RUBBER (Verified  Allergy, Unknown, 6/28/18)


Uncoded Allergies:  


     MUSHROOMS (Allergy, Unknown, 6/28/18)





Objective


Vital Signs





Last 24 Hour Vital Signs








  Date Time  Temp Pulse Resp B/P (MAP) Pulse Ox O2 Delivery O2 Flow Rate FiO2


 


7/3/18 14:01 98.4       





 98.4       


 


7/3/18 12:00 99.5 79 20 121/72 98 Room Air  





 99.5       


 


7/3/18 08:00 98.1 80 20 108/58 96 Room Air  





 98.1       


 


7/3/18 04:00 98.8 67 19 126/92 97 Room Air  





 98.8       


 


7/2/18 20:00 99.3 89 20 127/68 98 Room Air  





 99.3       


 


7/2/18 16:00 99.3 90 20 129/81 98   





 99.3       








Height (Feet):  5


Height (Inches):  5.00


Weight (Pounds):  230


General Appearance:  WD/WN, no acute distress


HEENT:  normocephalic, atraumatic, anicteric, mucous membranes moist, PERRL, 

EOMI, pharynx normal, supple, no JVD


Respiratory/Chest:  chest wall non-tender, lungs clear, normal breath sounds, 

no respiratory distress, no accessory muscle use


Cardiovascular:  normal peripheral pulses, normal rate, regular rhythm, no 

gallop/murmur, no JVD


Abdomen:  normal bowel sounds, soft, non tender, no organomegaly, non distended

, no mass, no scars


Genitourinary:  normal external genitalia


Extremities:  no cyanosis, no clubbing


Skin:  no rash, no lesions, no ulcers


Neurologic/Psychiatric:  alert, oriented x 3, responsive





Laboratory Tests








Test


  7/3/18


06:25


 


White Blood Count


  9.5 K/UL


(4.8-10.8)


 


Red Blood Count


  3.66 M/UL


(4.20-5.40)  L


 


Hemoglobin


  11.3 G/DL


(12.0-16.0)  L


 


Hematocrit


  34.2 %


(37.0-47.0)  L


 


Mean Corpuscular Volume 93 FL (80-99)  


 


Mean Corpuscular Hemoglobin


  30.8 PG


(27.0-31.0)


 


Mean Corpuscular Hemoglobin


Concent 32.9 G/DL


(32.0-36.0)


 


Red Cell Distribution Width


  11.9 %


(11.6-14.8)


 


Platelet Count


  522 K/UL


(150-450)  H


 


Mean Platelet Volume


  6.2 FL


(6.5-10.1)  L


 


Neutrophils (%) (Auto)


  59.4 %


(45.0-75.0)


 


Lymphocytes (%) (Auto)


  32.1 %


(20.0-45.0)


 


Monocytes (%) (Auto)


  6.8 %


(1.0-10.0)


 


Eosinophils (%) (Auto)


  1.3 %


(0.0-3.0)


 


Basophils (%) (Auto)


  0.5 %


(0.0-2.0)











Current Medications








 Medications


  (Trade)  Dose


 Ordered  Sig/Oswaldo


 Route


 PRN Reason  Start Time


 Stop Time Status Last Admin


Dose Admin


 


 Acetaminophen


  (Tylenol)  650 mg  Q4H  PRN


 ORAL


 Mild Pain/Temp > 100.5  6/28/18 08:15


 7/28/18 08:14  7/3/18 01:56


 


 


 Dextrose


  (Dextrose 50%)  25 ml  STAT  PRN


 IV


 Hypoglycemia  6/28/18 08:15


 7/28/18 08:14   


 


 


 Dextrose


  (Dextrose 50%)  50 ml  STAT  PRN


 IV


 Hypoglycemia  6/28/18 08:15


 7/28/18 08:14   


 


 


 Dextrose/Sodium


 Chloride  1,000 ml @ 


 125 mls/hr  Q8H


 IV


   6/28/18 09:00


 7/28/18 08:59  7/3/18 08:36


 


 


 Enoxaparin Sodium


  (Lovenox)  30 mg  EVERY 12  HOURS


 SUBQ


   6/29/18 10:30


 7/29/18 10:29  7/3/18 08:36


 


 


 Glimepiride


  (Amaryl)  2 mg  ACBREAKFAST


 ORAL


   6/30/18 06:30


 7/30/18 06:29  7/3/18 05:31


 


 


 Insulin Aspart


  (NovoLOG)    BEFORE MEALS AND  HS


 SUBQ


   6/28/18 11:30


 7/28/18 11:29  7/3/18 12:26


 


 


 Meropenem 1 gm/


 Sodium Chloride  55 ml @ 


 110 mls/hr  Q8HR


 IVPB


   6/29/18 16:00


 7/8/18 15:59  7/3/18 13:51


 


 


 Morphine Sulfate


  (Morphine


 Sulfate)  2 mg  Q8H  PRN


 IVP


 pain 4-10  6/28/18 08:15


 7/5/18 08:14  7/2/18 22:32


 


 


 Ondansetron HCl


  (Zofran)  4 mg  Q6H  PRN


 IVP


 Nausea & Vomiting  6/28/18 08:45


 7/28/18 08:44  7/1/18 17:41


 


 


 Pantoprazole


  (Protonix)  40 mg  ACBREAKFAST


 ORAL


   7/2/18 06:30


 8/1/18 06:29  7/3/18 05:31


 


 


 Sitagliptin


 Phosphate


  (Januvia)  25 mg  ACBREAKFAST


 ORAL


   6/30/18 06:30


 7/30/18 06:29  7/3/18 05:31


 

















Anna Lopez M.D. Jul 3, 2018 15:48

## 2018-07-03 NOTE — GENERAL PROGRESS NOTE
Assessment/Plan


Assessment/Plan


S: I have pain in my wrist





O: appears more comfortable . toelrating diet. persistent fever, Positive for 

general weakness





PHYSICAL EXAMINATION:


HEAD AND NECK:  Atraumatic and normocephalic.


CHEST:  Clear to auscultation.


HEART:  S1 and S2.  Regular rate and rhythm.


ABDOMEN:  Positive for tenderness on deep palpation.  Negative for any


rebound tenderness.


MUSCULOSKELETAL:  No gross focal motor deficit.


NEUROLOGIC:  The patient is awake, alert, and oriented x3.





Imaging: Ct of head and neck negative





Meds: reviewed. including Vanco and Meropenem





ASSESSMENT:


1. Acute pyelonephritis.


2. DM-2 : Uncontrolled 


3. GI and DVT prophylaxis.





PLAN OF CARE: 


Pending completion of workup by ID


Antibiotic per ID Rec.





Subjective


Allergies:  


Coded Allergies:  


     PENICILLINS (Verified  Allergy, Severe, Anaphylaxis, 6/28/18)


     PINEAPPLE (Verified  Allergy, Severe, 6/28/18)


     SULFAMETHOXAZOLE (Verified  Allergy, Severe, Anaphylaxis, 6/28/18)


     TRIMETHOPRIM (Verified  Allergy, Severe, Anaphylaxis, 6/28/18)


     LATEX, NATURAL RUBBER (Verified  Allergy, Unknown, 6/28/18)


Uncoded Allergies:  


     MUSHROOMS (Allergy, Unknown, 6/28/18)





Objective





Last 24 Hour Vital Signs








  Date Time  Temp Pulse Resp B/P (MAP) Pulse Ox O2 Delivery O2 Flow Rate FiO2


 


7/3/18 08:00 98.1 80 20 108/58 96 Room Air  





 98.1       


 


7/3/18 04:00 98.8 67 19 126/92 97 Room Air  





 98.8       


 


7/2/18 20:00 99.3 89 20 127/68 98 Room Air  





 99.3       


 


7/2/18 16:00 99.3 90 20 129/81 98   





 99.3       

















Intake and Output  


 


 7/2/18 7/3/18





 19:00 07:00


 


Intake Total 800 ml 


 


Output Total  850 ml


 


Balance 800 ml -850 ml


 


  


 


Intake Oral 800 ml 


 


Output Urine Total  850 ml


 


# Voids 6 


 


# Bowel Movements 1 1








Laboratory Tests


7/3/18 06:25: 


White Blood Count 9.5, Red Blood Count 3.66L, Hemoglobin 11.3L, Hematocrit 34.2L

, Mean Corpuscular Volume 93, Mean Corpuscular Hemoglobin 30.8, Mean 

Corpuscular Hemoglobin Concent 32.9, Red Cell Distribution Width 11.9, Platelet 

Count 522H, Mean Platelet Volume 6.2L, Neutrophils (%) (Auto) 59.4, Lymphocytes 

(%) (Auto) 32.1, Monocytes (%) (Auto) 6.8, Eosinophils (%) (Auto) 1.3, 

Basophils (%) (Auto) 0.5


Height (Feet):  5


Height (Inches):  5.00


Weight (Pounds):  230











Ave Arceo MD Jul 3, 2018 12:14

## 2018-07-05 NOTE — DISCHARGE SUMMARY
Discharge Summary


Discharge Summary


_


DATE OF ADMISSION: 06/28/2018





DATE OF DISCHARGE: 07/03/2018





33 years old female with history of diabetes  and hypertension, presented with 

complaint of abdominal pain, fever chills and vomiting for 5 days.  


Vomiting described as nonbloody and nonbilious 


Patient reported generalized weakness.  


Pain 10 out of 10


Upon evaluation in emergency room patient was febrile 101.1 with leukocytosis 

WBC 17.3, stable hemoglobin and hematocrit.  


Lactic acid 1.6.  


Urinalysis with evidence of UTI 


Urine pregnancy test was negative 


CT of the abdomen and pelvis revealed suspected left hydronephrosis,  but no 

abscess


Sodium 128 


BUN 17,  creatinine 1.7


Blood sugar 498,  no evidence of DKA ,stable anion gap and CO2


Patient admitted with diagnosis of sepsis, acute pyelonephritis ,acute kidney 

injury, dehydration ,diabetes mellitus out of control with hyperglycemia  





CONSULTANTS:


ID specialist Dr. Lopez


 


[facility] COURSE: 


Patient admitted.  


Patient started on generous IV fluids and  empiric antibiotics. 


Infectious disease specialist consult was requested.  


Antibiotic regimen was optimized based on sensitivity as per ID doctor 

recommendations.  


Blood culture were negative ,urine culture revealed Escherichia coli and strep 

group B.  


Symptomatic treatment provided.  


Pain management was addressed.


Antiemetics provided as needed.  


Patient was able to tolerate diet. 


Abdominal ultrasound revealed hepatomegaly and  fatty infiltration. 





Renal parameters and electrolytes were closely monitored.  


Electrolytes were replaced as needed.  


Nephrotoxins were avoided.


Prior to discharge sodium up to 138.  Creatinine down to 1.0.


Renal ultrasound revealed no hydronephrosis, thickened and underdistended 

bladder. 


Blood sugar was managed with the oral anti-glycemic regimen and sliding scale  

of insulin.


Hemoglobin A1c 10.4 ,clearly not at goal.  


DVT and GI prophayxlis provided. 


Blood pressure was closely monitored, stable, no need for antihypertensive 

medications at this time. 


Leukocytosis resolved, patient afebrile. 


Patient clinically improved 





Patient was stable for discharge home .


Antibiotic prescription was provided by infectious disease specialist.  








FINAL DIAGNOSES: 


Sepsis


Acute pyelonephritis 


Acute kidney injury


Dehydration 


Diabetes mellitus out of control


Headache





DISCHARGE MEDICATIONS:


See Medication Reconciliation list.





DISCHARGE INSTRUCTIONS:


Patient was discharged home.  


Follow up with primary care provider in one week.


Patient needs  tighter blood sugar control


Recommended to follow-up with the urologist as outpatient.  


Complete antibiotic course as recommended by infectious disease specialist for 

10 additional days.  


Encourage adequate hydration.


 


I have been assigned to dictate discharge summary for this account. I was not 

involved in the patient's management.











Samantha Dominguez NP Jul 5, 2018 12:45

## 2022-09-28 NOTE — EMERGENCY ROOM REPORT
History of Present Illness


General


Chief Complaint:  Wound Recheck/Suture Removal


Source:  Medical Record





Present Illness


HPI


The patient is a 32-year-old female presenting for rash.  The patient noticed a 

rash under the left breast which she described as painful.  She noticed a white 

discharge from it one week prior.  Pain is now described as a 5/10 dull ache it 

is worse with touch.  Does not radiate.


She also noticed a vaginal rash which began one month prior.  She was treated 

for candidiasis but states this did not help.  She states that this is a 7/10 

burning sensation, worse with touch.  She denies dysuria, hematuria, vaginal 

discharge.


She also states that she has a rash under both armpits.  She states that she 

has been using a new deodorant.  This pain is a 5/10 burning sensation.  Worse 

with touch.


She denies any other symptoms including N, V, F, chills


Allergies:  


Coded Allergies:  


     PENICILLINS (Verified  Allergy, Severe, Anaphylaxis, 1/3/17)


     PINEAPPLE (Verified  Allergy, Severe, 1/3/17)


     SULFAMETHOXAZOLE (Verified  Allergy, Severe, Anaphylaxis, 1/3/17)


     TRIMETHOPRIM (Verified  Allergy, Severe, Anaphylaxis, 1/3/17)


     LATEX, NATURAL RUBBER (Verified  Allergy, Unknown, 1/3/17)





Patient History


Past Medical History:  see triage record


Pertinent Family History:  none


Last Menstrual Period:  "last year"


Pregnant Now:  No


Reviewed Nursing Documentation:  PMH: Agreed, PSxH: Agreed





Nursing Documentation-PMH


Past Medical History:  No History, Except For


Hx Hypertension:  Yes


Hx Asthma:  Yes


Hx Diabetes:  Yes - Borderline





Review of Systems


All Other Systems:  negative except mentioned in HPI





Physical Exam





Vital Signs








  Date Time  Temp Pulse Resp B/P Pulse Ox O2 Delivery O2 Flow Rate FiO2


 


7/10/17 15:00 98.6 109 16 130/86 96 Room Air  








Sp02 EP Interpretation:  reviewed, normal


General Appearance:  no apparent distress, alert, GCS 15, non-toxic


Head:  normocephalic, atraumatic


Eyes:  bilateral eye PERRL, bilateral eye normal inspection


ENT:  hearing grossly normal, normal pharynx, no angioedema, normal voice


Neck:  full range of motion, supple/symm/no masses


Respiratory:  chest non-tender, lungs clear, normal breath sounds, no wheezing, 

speaking full sentences


Musculoskeletal:  back normal, gait/station normal, normal range of motion, non-

tender, calf tenderness


Neurologic:  alert, oriented x3, responsive, motor strength/tone normal, 

sensory intact, normal gait, speech normal


Psychiatric:  judgement/insight normal, memory normal, mood/affect normal, no 

suicidal/homicidal ideation


Skin:  rash - bilat axilla erythema. No edema.


Lymphatic:  no adenopathy





Medical Decision Making


PA Attestation


Dr. Shaffer is my supervising physician. Patient management was discussed with 

my supervising physician


Diagnostic Impression:  


 Primary Impression:  


 Contact dermatitis


 Qualified Codes:  L25.9 - Unspecified contact dermatitis, unspecified cause


 Additional Impression:  


 Abscess


ER Course


The patient is a 32-year-old female presenting for rash





Ddx considered include but not limited to insect bite, contact dermatitis, 

eczema, cellulitis, candidiasis, among others





PE: afebrile. 


There is a bilateral rash on her axillas.  Diffuse erythema.  No edema.  

Nontender.


There is a 2 cm indurated abscess lateral to the left breast.  Tender to 

palpation.  No fluctuance.  No erythema.


There is a diffuse, macular, macerated rash of the external vaginal region.  

Tender to palpation.  No discharge.





Exam was done with Rosario in the room 





The patient will be discharged prescription for Keflex and triamcinolone.  She 

will stop using the new deodorant.  ER precautions are given





Laboratory Tests








Test


  7/10/17


15:54


 


Urine Color Pale yellow  


 


Urine Appearance Clear  


 


Urine pH 6 (4.5-8.0)  


 


Urine Specific Gravity


  1.005


(1.005-1.035)


 


Urine Protein


  Negative


(NEGATIVE)


 


Urine Glucose (UA)


  4+ (NEGATIVE)


H


 


Urine Ketones


  1+ (NEGATIVE)


H


 


Urine Occult Blood


  Negative


(NEGATIVE)


 


Urine Nitrite


  Negative


(NEGATIVE)


 


Urine Bilirubin


  Negative


(NEGATIVE)


 


Urine Urobilinogen


  Normal MG/DL


(0.0-1.0)


 


Urine Leukocyte Esterase


  2+ (NEGATIVE)


H


 


Urine RBC


  0-2 /HPF (0 -


2)


 


Urine WBC


  2-4 /HPF (0 -


2)


 


Urine Squamous Epithelial


Cells Few /LPF


(NONE/OCC)


 


Urine Bacteria


  Few /HPF


(NONE)


 


Urine HCG, Qualitative Negative  








Lab Results Impression


UA unremarkable





Last Vital Signs








  Date Time  Temp Pulse Resp B/P Pulse Ox O2 Delivery O2 Flow Rate FiO2


 


7/10/17 16:43 98.5 103 15 133/82 97 Room Air  








Status:  improved


Disposition:  HOME, SELF-CARE


Condition:  Improved


Scripts


Triamcinolone Acet (Triamcinolone Acetonide) 15 Gm Cream..g.


15 GM APPLIC TID, #15 GM


   Prov: JUANA ROBLES         7/10/17 


Cephalexin* (KEFLEX*) 500 Mg Capsule


500 MG ORAL EVERY 12 HOURS, #14 CAP 0 Refills


   Prov: JUANA ROBLES         7/10/17


Patient Instructions:  Abscess, Rash





Additional Instructions:  


I discussed my findings with the patient. All questions and concerns have been 

answered. Treatment and medication compliance have been addressed. I advised 

the patient that they need to follow up with PMD in 3-5 days. Return to ED if 

symptoms worsen, new symptoms arise, or if needed for any reason. Patient 

verbalized understanding of discharge instructions.











JUANA ROBLES Jul 10, 2017 22:02 Pt fell from chair, c/o right elbow pain. Pt not moving affected arm. +pulses, BCR. No PMH, IUTD, NKDA. Pt awake, alert, interacting appropriately. Pt coloring appropriate, brisk capillary refill noted, easy WOB noted. Pt fell from chair, c/o right elbow pain. Pt not moving affected arm. No obvious deformity. +pulses, BCR. No PMH, IUTD, NKDA. Pt awake, alert, interacting appropriately. Pt coloring appropriate, brisk capillary refill noted, easy WOB noted.